# Patient Record
Sex: FEMALE | Race: WHITE | NOT HISPANIC OR LATINO | Employment: OTHER | URBAN - METROPOLITAN AREA
[De-identification: names, ages, dates, MRNs, and addresses within clinical notes are randomized per-mention and may not be internally consistent; named-entity substitution may affect disease eponyms.]

---

## 2023-03-09 ENCOUNTER — APPOINTMENT (OUTPATIENT)
Dept: GENERAL RADIOLOGY | Facility: HOSPITAL | Age: 73
End: 2023-03-09
Payer: MEDICARE

## 2023-03-09 ENCOUNTER — APPOINTMENT (OUTPATIENT)
Dept: CT IMAGING | Facility: HOSPITAL | Age: 73
End: 2023-03-09
Payer: MEDICARE

## 2023-03-09 ENCOUNTER — HOSPITAL ENCOUNTER (EMERGENCY)
Facility: HOSPITAL | Age: 73
Discharge: HOME | End: 2023-03-10
Attending: EMERGENCY MEDICINE | Admitting: EMERGENCY MEDICINE
Payer: MEDICARE

## 2023-03-09 DIAGNOSIS — F03.C0 SEVERE DEMENTIA WITHOUT BEHAVIORAL DISTURBANCE, PSYCHOTIC DISTURBANCE, MOOD DISTURBANCE, OR ANXIETY, UNSPECIFIED DEMENTIA TYPE: ICD-10-CM

## 2023-03-09 DIAGNOSIS — N39.0 ACUTE UTI: ICD-10-CM

## 2023-03-09 DIAGNOSIS — R10.30 LOWER ABDOMINAL PAIN: Primary | ICD-10-CM

## 2023-03-09 LAB
ALBUMIN SERPL-MCNC: 4.1 G/DL (ref 3.5–5.2)
ALBUMIN/GLOB SERPL: 1.5 G/DL
ALP SERPL-CCNC: 96 U/L (ref 39–117)
ALT SERPL W P-5'-P-CCNC: 10 U/L (ref 1–33)
ANION GAP SERPL CALCULATED.3IONS-SCNC: 12 MMOL/L (ref 5–15)
AST SERPL-CCNC: 28 U/L (ref 1–32)
BACTERIA UR QL AUTO: ABNORMAL /HPF
BASOPHILS # BLD AUTO: 0.1 10*3/MM3 (ref 0–0.2)
BASOPHILS NFR BLD AUTO: 0.7 % (ref 0–1.5)
BILIRUB SERPL-MCNC: 0.2 MG/DL (ref 0–1.2)
BILIRUB UR QL STRIP: NEGATIVE
BUN SERPL-MCNC: 31 MG/DL (ref 8–23)
BUN/CREAT SERPL: 26.3 (ref 7–25)
CALCIUM SPEC-SCNC: 9.2 MG/DL (ref 8.6–10.5)
CHLORIDE SERPL-SCNC: 110 MMOL/L (ref 98–107)
CLARITY UR: ABNORMAL
CO2 SERPL-SCNC: 23 MMOL/L (ref 22–29)
COLOR UR: YELLOW
CREAT SERPL-MCNC: 1.18 MG/DL (ref 0.57–1)
DEPRECATED RDW RBC AUTO: 46.3 FL (ref 37–54)
EGFRCR SERPLBLD CKD-EPI 2021: 48.6 ML/MIN/1.73
EOSINOPHIL # BLD AUTO: 0.05 10*3/MM3 (ref 0–0.4)
EOSINOPHIL NFR BLD AUTO: 0.4 % (ref 0.3–6.2)
ERYTHROCYTE [DISTWIDTH] IN BLOOD BY AUTOMATED COUNT: 12.9 % (ref 12.3–15.4)
GEN 5 2HR TROPONIN T REFLEX: 18 NG/L
GLOBULIN UR ELPH-MCNC: 2.7 GM/DL
GLUCOSE SERPL-MCNC: 152 MG/DL (ref 65–99)
GLUCOSE UR STRIP-MCNC: NEGATIVE MG/DL
HCT VFR BLD AUTO: 32.3 % (ref 34–46.6)
HGB BLD-MCNC: 10.3 G/DL (ref 12–15.9)
HGB UR QL STRIP.AUTO: ABNORMAL
HOLD SPECIMEN: NORMAL
HYALINE CASTS UR QL AUTO: ABNORMAL /LPF
IMM GRANULOCYTES # BLD AUTO: 0.04 10*3/MM3 (ref 0–0.05)
IMM GRANULOCYTES NFR BLD AUTO: 0.3 % (ref 0–0.5)
KETONES UR QL STRIP: ABNORMAL
LEUKOCYTE ESTERASE UR QL STRIP.AUTO: ABNORMAL
LIPASE SERPL-CCNC: 27 U/L (ref 13–60)
LYMPHOCYTES # BLD AUTO: 1.03 10*3/MM3 (ref 0.7–3.1)
LYMPHOCYTES NFR BLD AUTO: 7.6 % (ref 19.6–45.3)
MCH RBC QN AUTO: 31.3 PG (ref 26.6–33)
MCHC RBC AUTO-ENTMCNC: 31.9 G/DL (ref 31.5–35.7)
MCV RBC AUTO: 98.2 FL (ref 79–97)
MONOCYTES # BLD AUTO: 1.21 10*3/MM3 (ref 0.1–0.9)
MONOCYTES NFR BLD AUTO: 8.9 % (ref 5–12)
NEUTROPHILS NFR BLD AUTO: 11.1 10*3/MM3 (ref 1.7–7)
NEUTROPHILS NFR BLD AUTO: 82.1 % (ref 42.7–76)
NITRITE UR QL STRIP: POSITIVE
NRBC BLD AUTO-RTO: 0 /100 WBC (ref 0–0.2)
PH UR STRIP.AUTO: <=5 [PH] (ref 5–8)
PLATELET # BLD AUTO: 330 10*3/MM3 (ref 140–450)
PMV BLD AUTO: 10 FL (ref 6–12)
POTASSIUM SERPL-SCNC: 3.8 MMOL/L (ref 3.5–5.2)
PROT SERPL-MCNC: 6.8 G/DL (ref 6–8.5)
PROT UR QL STRIP: ABNORMAL
RBC # BLD AUTO: 3.29 10*6/MM3 (ref 3.77–5.28)
RBC # UR STRIP: ABNORMAL /HPF
REF LAB TEST METHOD: ABNORMAL
SODIUM SERPL-SCNC: 145 MMOL/L (ref 136–145)
SP GR UR STRIP: 1.02 (ref 1–1.03)
SQUAMOUS #/AREA URNS HPF: ABNORMAL /HPF
TROPONIN T DELTA: 2 NG/L
TROPONIN T SERPL HS-MCNC: 16 NG/L
UROBILINOGEN UR QL STRIP: ABNORMAL
WBC # UR STRIP: ABNORMAL /HPF
WBC NRBC COR # BLD: 13.53 10*3/MM3 (ref 3.4–10.8)
WHOLE BLOOD HOLD COAG: NORMAL
WHOLE BLOOD HOLD SPECIMEN: NORMAL

## 2023-03-09 PROCEDURE — 80053 COMPREHEN METABOLIC PANEL: CPT | Performed by: EMERGENCY MEDICINE

## 2023-03-09 PROCEDURE — 96365 THER/PROPH/DIAG IV INF INIT: CPT

## 2023-03-09 PROCEDURE — 74176 CT ABD & PELVIS W/O CONTRAST: CPT

## 2023-03-09 PROCEDURE — 25010000002 CEFTRIAXONE PER 250 MG: Performed by: EMERGENCY MEDICINE

## 2023-03-09 PROCEDURE — 73560 X-RAY EXAM OF KNEE 1 OR 2: CPT

## 2023-03-09 PROCEDURE — 85025 COMPLETE CBC W/AUTO DIFF WBC: CPT | Performed by: EMERGENCY MEDICINE

## 2023-03-09 PROCEDURE — 83690 ASSAY OF LIPASE: CPT | Performed by: EMERGENCY MEDICINE

## 2023-03-09 PROCEDURE — 87040 BLOOD CULTURE FOR BACTERIA: CPT | Performed by: EMERGENCY MEDICINE

## 2023-03-09 PROCEDURE — 81001 URINALYSIS AUTO W/SCOPE: CPT | Performed by: EMERGENCY MEDICINE

## 2023-03-09 PROCEDURE — 84484 ASSAY OF TROPONIN QUANT: CPT | Performed by: EMERGENCY MEDICINE

## 2023-03-09 PROCEDURE — 87186 SC STD MICRODIL/AGAR DIL: CPT | Performed by: EMERGENCY MEDICINE

## 2023-03-09 PROCEDURE — 99283 EMERGENCY DEPT VISIT LOW MDM: CPT

## 2023-03-09 PROCEDURE — 36415 COLL VENOUS BLD VENIPUNCTURE: CPT

## 2023-03-09 PROCEDURE — 87086 URINE CULTURE/COLONY COUNT: CPT | Performed by: EMERGENCY MEDICINE

## 2023-03-09 PROCEDURE — P9612 CATHETERIZE FOR URINE SPEC: HCPCS

## 2023-03-09 PROCEDURE — 87077 CULTURE AEROBIC IDENTIFY: CPT | Performed by: EMERGENCY MEDICINE

## 2023-03-09 PROCEDURE — 73502 X-RAY EXAM HIP UNI 2-3 VIEWS: CPT

## 2023-03-09 RX ORDER — CEFDINIR 300 MG/1
300 CAPSULE ORAL 2 TIMES DAILY
Qty: 14 CAPSULE | Refills: 0 | Status: SHIPPED | OUTPATIENT
Start: 2023-03-09 | End: 2023-03-16

## 2023-03-09 RX ORDER — SODIUM CHLORIDE 0.9 % (FLUSH) 0.9 %
10 SYRINGE (ML) INJECTION AS NEEDED
Status: DISCONTINUED | OUTPATIENT
Start: 2023-03-09 | End: 2023-03-10 | Stop reason: HOSPADM

## 2023-03-09 RX ADMIN — SODIUM CHLORIDE 1000 ML: 9 INJECTION, SOLUTION INTRAVENOUS at 23:45

## 2023-03-09 RX ADMIN — SODIUM CHLORIDE 1 G: 900 INJECTION INTRAVENOUS at 23:45

## 2023-03-09 NOTE — ED PROVIDER NOTES
Subjective   History of Present Illness  Patient is a pleasant 72-year-old female with history of severe dementia who presents with pain.   states for the past 3 days she has had difficulty walking and even slight movements in bed or transitions her from one position to another she grimaces.  He is unable to decipher where her pain is coming from.  She has not had any vomiting.  He does not know of any fevers.  She has not had any difficulty breathing.  She actually appears to be very comfortable until she is moved.  He states that even in bed the last few nights she has been bringing her knees up and getting in the fetal position which is very unusual for her.  Denies similar episodes in the past.  As noted above, due to her severe dementia she is unable to communicate.        Review of Systems   All other systems reviewed and are negative.      No past medical history on file.    No Known Allergies    No past surgical history on file.    No family history on file.    Social History     Socioeconomic History   • Marital status:            Objective   Physical Exam  Vitals and nursing note reviewed.   Constitutional:       Appearance: Normal appearance. She is normal weight.   HENT:      Head: Normocephalic.      Comments: Small healing contusion on her left  Eyes:      Extraocular Movements: Extraocular movements intact.      Conjunctiva/sclera: Conjunctivae normal.      Pupils: Pupils are equal, round, and reactive to light.   Cardiovascular:      Rate and Rhythm: Normal rate and regular rhythm.      Pulses: Normal pulses.      Heart sounds: Normal heart sounds. No murmur heard.  Pulmonary:      Effort: Pulmonary effort is normal. No respiratory distress.      Breath sounds: Normal breath sounds. No wheezing or rhonchi.   Abdominal:      General: Bowel sounds are normal. There is no distension.      Palpations: Abdomen is soft.      Tenderness: There is abdominal tenderness (Mild tenderness through  the lower abdomen.). There is no guarding or rebound.   Musculoskeletal:         General: Normal range of motion.      Cervical back: Normal range of motion. No rigidity.      Comments: Patient has discomfort with range of motion of her right lower extremity.  I am unable to localize the pain between her hip or knee.  Is a small contusion just above her right knee but this does not seem particularly painful to palpation.   Skin:     General: Skin is warm and dry.      Capillary Refill: Capillary refill takes less than 2 seconds.   Neurological:      General: No focal deficit present.      Mental Status: She is alert. Mental status is at baseline. She is disoriented.      Comments: Patient is unable to speak or communicate, consistent with her dementia   Psychiatric:         Mood and Affect: Mood normal.         Behavior: Behavior normal.         Thought Content: Thought content normal.         Procedures           ED Course      Recent Results (from the past 24 hour(s))   Comprehensive Metabolic Panel    Collection Time: 03/09/23  6:50 PM    Specimen: Blood   Result Value Ref Range    Glucose 152 (H) 65 - 99 mg/dL    BUN 31 (H) 8 - 23 mg/dL    Creatinine 1.18 (H) 0.57 - 1.00 mg/dL    Sodium 145 136 - 145 mmol/L    Potassium 3.8 3.5 - 5.2 mmol/L    Chloride 110 (H) 98 - 107 mmol/L    CO2 23.0 22.0 - 29.0 mmol/L    Calcium 9.2 8.6 - 10.5 mg/dL    Total Protein 6.8 6.0 - 8.5 g/dL    Albumin 4.1 3.5 - 5.2 g/dL    ALT (SGPT) 10 1 - 33 U/L    AST (SGOT) 28 1 - 32 U/L    Alkaline Phosphatase 96 39 - 117 U/L    Total Bilirubin 0.2 0.0 - 1.2 mg/dL    Globulin 2.7 gm/dL    A/G Ratio 1.5 g/dL    BUN/Creatinine Ratio 26.3 (H) 7.0 - 25.0    Anion Gap 12.0 5.0 - 15.0 mmol/L    eGFR 48.6 (L) >60.0 mL/min/1.73   Lipase    Collection Time: 03/09/23  6:50 PM    Specimen: Blood   Result Value Ref Range    Lipase 27 13 - 60 U/L   CBC Auto Differential    Collection Time: 03/09/23  6:50 PM    Specimen: Blood   Result Value Ref Range     WBC 13.53 (H) 3.40 - 10.80 10*3/mm3    RBC 3.29 (L) 3.77 - 5.28 10*6/mm3    Hemoglobin 10.3 (L) 12.0 - 15.9 g/dL    Hematocrit 32.3 (L) 34.0 - 46.6 %    MCV 98.2 (H) 79.0 - 97.0 fL    MCH 31.3 26.6 - 33.0 pg    MCHC 31.9 31.5 - 35.7 g/dL    RDW 12.9 12.3 - 15.4 %    RDW-SD 46.3 37.0 - 54.0 fl    MPV 10.0 6.0 - 12.0 fL    Platelets 330 140 - 450 10*3/mm3    Neutrophil % 82.1 (H) 42.7 - 76.0 %    Lymphocyte % 7.6 (L) 19.6 - 45.3 %    Monocyte % 8.9 5.0 - 12.0 %    Eosinophil % 0.4 0.3 - 6.2 %    Basophil % 0.7 0.0 - 1.5 %    Immature Grans % 0.3 0.0 - 0.5 %    Neutrophils, Absolute 11.10 (H) 1.70 - 7.00 10*3/mm3    Lymphocytes, Absolute 1.03 0.70 - 3.10 10*3/mm3    Monocytes, Absolute 1.21 (H) 0.10 - 0.90 10*3/mm3    Eosinophils, Absolute 0.05 0.00 - 0.40 10*3/mm3    Basophils, Absolute 0.10 0.00 - 0.20 10*3/mm3    Immature Grans, Absolute 0.04 0.00 - 0.05 10*3/mm3    nRBC 0.0 0.0 - 0.2 /100 WBC   Green Top (Gel)    Collection Time: 03/09/23  6:50 PM   Result Value Ref Range    Extra Tube Hold for add-ons.    Lavender Top    Collection Time: 03/09/23  6:50 PM   Result Value Ref Range    Extra Tube hold for add-on    Gold Top - SST    Collection Time: 03/09/23  6:50 PM   Result Value Ref Range    Extra Tube Hold for add-ons.    Gray Top    Collection Time: 03/09/23  6:50 PM   Result Value Ref Range    Extra Tube Hold for add-ons.    Light Blue Top    Collection Time: 03/09/23  6:50 PM   Result Value Ref Range    Extra Tube Hold for add-ons.    High Sensitivity Troponin T    Collection Time: 03/09/23  6:50 PM    Specimen: Blood   Result Value Ref Range    HS Troponin T 16 (H) <10 ng/L   Urinalysis With Microscopic If Indicated (No Culture) - Urine, Catheter    Collection Time: 03/09/23  9:22 PM    Specimen: Urine, Catheter   Result Value Ref Range    Color, UA Yellow Yellow, Straw    Appearance, UA Cloudy (A) Clear    pH, UA <=5.0 5.0 - 8.0    Specific Gravity, UA 1.025 1.001 - 1.030    Glucose, UA Negative Negative     Ketones, UA Trace (A) Negative    Bilirubin, UA Negative Negative    Blood, UA Trace (A) Negative    Protein, UA 30 mg/dL (1+) (A) Negative    Leuk Esterase, UA Small (1+) (A) Negative    Nitrite, UA Positive (A) Negative    Urobilinogen, UA 1.0 E.U./dL 0.2 - 1.0 E.U./dL   Urinalysis, Microscopic Only - Urine, Catheter    Collection Time: 03/09/23  9:22 PM    Specimen: Urine, Catheter   Result Value Ref Range    RBC, UA None Seen None Seen, 0-2 /HPF    WBC, UA 21-30 (A) None Seen, 0-2 /HPF    Bacteria, UA 4+ (A) None Seen, Trace /HPF    Squamous Epithelial Cells, UA None Seen None Seen, 0-2 /HPF    Hyaline Casts, UA 0-6 0 - 6 /LPF    Methodology Automated Microscopy    High Sensitivity Troponin T 2Hr    Collection Time: 03/09/23  9:59 PM    Specimen: Arm, Right; Blood   Result Value Ref Range    HS Troponin T 18 (H) <10 ng/L    Troponin T Delta 2 >=-4 - <+4 ng/L     Note: In addition to lab results from this visit, the labs listed above may include labs taken at another facility or during a different encounter within the last 24 hours. Please correlate lab times with ED admission and discharge times for further clarification of the services performed during this visit.    CT Abdomen Pelvis Without Contrast   Final Result   Impression:   No acute abdominal or pelvic abnormality is identified on noncontrast CT.         Electronically Signed: Mary Jane Jaramillo     3/9/2023 8:47 PM EST     Workstation ID: [id number]      XR Knee 1 or 2 View Right   Final Result   Impression:   1.No acute osseous abnormality is identified of the right knee.   2.Mild tricompartmental arthritic change.      Electronically Signed: Mary Jane Jaramillo     3/9/2023 8:04 PM EST     Workstation ID: [id number]      XR Hip With or Without Pelvis 2 - 3 View Right   Final Result   Impression:   No acute osseous abnormality is identified in the right hip.      Electronically Signed: Mary Jane Jaramillo     3/9/2023 8:05 PM EST     Workstation ID: [id number]         Vitals:    03/09/23 2200 03/09/23 2300 03/10/23 0000 03/10/23 0030   BP: 127/68 117/65 134/61 128/70   BP Location:       Patient Position:       Pulse:       Resp:       Temp:       TempSrc:       SpO2:         Medications   sodium chloride 0.9 % bolus 1,000 mL (0 mL Intravenous Stopped 3/10/23 0052)   cefTRIAXone (ROCEPHIN) 1 g/100 mL 0.9% NS (MBP) (0 g Intravenous Stopped 3/10/23 0052)     ECG/EMG Results (last 24 hours)     ** No results found for the last 24 hours. **        No orders to display           Medical Decision Making  Pt presents with pain at unknown location.  Broad differential including intraabdominal, back, and lower extremity joint etiologies. Severe dementia limits diagnostic ability.  UTI noted.   states that he is able to care for her at home.  He understands to have a low threshold to return to the ED if symptoms persist, worsen, or other concerns arise.    Acute UTI: complicated acute illness or injury  Lower abdominal pain: complicated acute illness or injury  Amount and/or Complexity of Data Reviewed  Independent Historian: spouse  Labs: ordered. Decision-making details documented in ED Course.  Radiology: ordered and independent interpretation performed. Decision-making details documented in ED Course.      Risk  Prescription drug management.          Final diagnoses:   Lower abdominal pain   Acute UTI   Severe dementia without behavioral disturbance, psychotic disturbance, mood disturbance, or anxiety, unspecified dementia type       ED Disposition  ED Disposition     ED Disposition   Discharge    Condition   Stable    Comment   --           DISCHARGE    Patient discharged in stable condition.    Reviewed implications of results, diagnosis, meds, responsibility to follow up, warning signs and symptoms of possible worsening, potential complications and reasons to return to ER.    Patient/Family voiced understanding of above instructions.    Discussed plan for discharge, as  there is no emergent indication for admission.  Pt/family is agreeable and understands need for follow up and possible repeat testing.  Pt/family is aware that discharge does not mean that nothing is wrong but that it indicates no emergency is currently present that requires admission and they must continue care with follow-up as given below or with a physician of their choice.     FOLLOW-UP  Mukul Fontaine MD  [address]  [facility] [id number] [phone]    Schedule an appointment as soon as possible for a visit       [facility] Emergency [facility]  [address] [place] [id number] [phone]    If symptoms worsen         Medication List      New Prescriptions    cefdinir 300 MG capsule  Commonly known as: OMNICEF  Take 1 capsule by mouth 2 (Two) Times a Day for 7 days.           Where to Get Your Medications      These medications were sent to [id number] - [place], [place] - [address] [facility] [address] - [phone]  - [phone] FX  [address] [address], [facility] [address]    Phone: [phone]   · cefdinir 300 MG capsule            Bryn Castanon DO  03/10/23 0936

## 2023-03-10 VITALS
OXYGEN SATURATION: 97 % | HEART RATE: 87 BPM | DIASTOLIC BLOOD PRESSURE: 70 MMHG | SYSTOLIC BLOOD PRESSURE: 128 MMHG | TEMPERATURE: 97.3 F | RESPIRATION RATE: 18 BRPM

## 2023-03-12 LAB
BACTERIA SPEC AEROBE CULT: ABNORMAL
BACTERIA SPEC AEROBE CULT: ABNORMAL

## 2023-03-15 LAB
BACTERIA SPEC AEROBE CULT: NORMAL
BACTERIA SPEC AEROBE CULT: NORMAL

## 2023-04-16 ENCOUNTER — APPOINTMENT (OUTPATIENT)
Dept: CT IMAGING | Facility: HOSPITAL | Age: 73
End: 2023-04-16
Payer: MEDICARE

## 2023-04-16 ENCOUNTER — HOSPITAL ENCOUNTER (OUTPATIENT)
Facility: HOSPITAL | Age: 73
Setting detail: OBSERVATION
Discharge: HOME | End: 2023-04-22
Attending: EMERGENCY MEDICINE | Admitting: INTERNAL MEDICINE
Payer: MEDICARE

## 2023-04-16 ENCOUNTER — APPOINTMENT (OUTPATIENT)
Dept: GENERAL RADIOLOGY | Facility: HOSPITAL | Age: 73
End: 2023-04-16
Payer: MEDICARE

## 2023-04-16 DIAGNOSIS — R82.71 BACTERIURIA WITH PYURIA: ICD-10-CM

## 2023-04-16 DIAGNOSIS — F02.C0 SEVERE ALZHEIMER'S DEMENTIA, UNSPECIFIED TIMING OF DEMENTIA ONSET, UNSPECIFIED WHETHER BEHAVIORAL, PSYCHOTIC, OR MOOD DISTURBANCE OR ANXIETY: ICD-10-CM
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
## 2023-04-16 DIAGNOSIS — G30.9 SEVERE ALZHEIMER'S DEMENTIA, UNSPECIFIED TIMING OF DEMENTIA ONSET, UNSPECIFIED WHETHER BEHAVIORAL, PSYCHOTIC, OR MOOD DISTURBANCE OR ANXIETY: ICD-10-CM

## 2023-04-16 DIAGNOSIS — R82.81 BACTERIURIA WITH PYURIA: ICD-10-CM

## 2023-04-16 DIAGNOSIS — S32.82XA MULTIPLE CLOSED FRACTURES OF PELVIS WITHOUT DISRUPTION OF PELVIC RING, INITIAL ENCOUNTER: ICD-10-CM

## 2023-04-16 PROBLEM — M19.90 OA (OSTEOARTHRITIS): Status: ACTIVE | Noted: 2023-04-16

## 2023-04-16 PROBLEM — F03.90 DEMENTIA: Status: ACTIVE | Noted: 2023-04-16

## 2023-04-16 PROBLEM — S32.599A PUBIC RAMUS FRACTURE: Status: ACTIVE | Noted: 2023-04-16

## 2023-04-16 PROBLEM — E87.6 HYPOKALEMIA: Status: ACTIVE | Noted: 2023-04-16

## 2023-04-16 PROBLEM — N18.30 CKD (CHRONIC KIDNEY DISEASE) STAGE 3, GFR 30-59 ML/MIN: Status: ACTIVE | Noted: 2023-04-16

## 2023-04-16 PROBLEM — S32.10XA SACRAL FRACTURE: Status: ACTIVE | Noted: 2023-04-16

## 2023-04-16 PROBLEM — N39.0 UTI (URINARY TRACT INFECTION): Status: ACTIVE | Noted: 2023-04-16

## 2023-04-16 PROBLEM — E78.5 HLD (HYPERLIPIDEMIA): Status: ACTIVE | Noted: 2023-04-16

## 2023-04-16 LAB
ABO GROUP BLD: NORMAL
ABO GROUP BLD: NORMAL
ALBUMIN SERPL-MCNC: 3.4 G/DL (ref 3.5–5.2)
ALBUMIN/GLOB SERPL: 1.2 G/DL
ALP SERPL-CCNC: 114 U/L (ref 39–117)
ALT SERPL W P-5'-P-CCNC: 15 U/L (ref 1–33)
ANION GAP SERPL CALCULATED.3IONS-SCNC: 12 MMOL/L (ref 5–15)
APTT PPP: 28.1 SECONDS (ref 60–90)
AST SERPL-CCNC: 26 U/L (ref 1–32)
BACTERIA UR QL AUTO: ABNORMAL /HPF
BASOPHILS # BLD AUTO: 0.09 10*3/MM3 (ref 0–0.2)
BASOPHILS NFR BLD AUTO: 0.6 % (ref 0–1.5)
BILIRUB SERPL-MCNC: 0.6 MG/DL (ref 0–1.2)
BILIRUB UR QL STRIP: NEGATIVE
BLD GP AB SCN SERPL QL: NEGATIVE
BUN SERPL-MCNC: 18 MG/DL (ref 8–23)
BUN/CREAT SERPL: 16.8 (ref 7–25)
CALCIUM SPEC-SCNC: 8.6 MG/DL (ref 8.6–10.5)
CHLORIDE SERPL-SCNC: 103 MMOL/L (ref 98–107)
CLARITY UR: ABNORMAL
CO2 SERPL-SCNC: 26 MMOL/L (ref 22–29)
COLOR UR: YELLOW
CREAT SERPL-MCNC: 1.07 MG/DL (ref 0.57–1)
DEPRECATED RDW RBC AUTO: 45.7 FL (ref 37–54)
EGFRCR SERPLBLD CKD-EPI 2021: 54.3 ML/MIN/1.73
EOSINOPHIL # BLD AUTO: 0.09 10*3/MM3 (ref 0–0.4)
EOSINOPHIL NFR BLD AUTO: 0.6 % (ref 0.3–6.2)
ERYTHROCYTE [DISTWIDTH] IN BLOOD BY AUTOMATED COUNT: 13 % (ref 12.3–15.4)
GLOBULIN UR ELPH-MCNC: 2.9 GM/DL
GLUCOSE SERPL-MCNC: 140 MG/DL (ref 65–99)
GLUCOSE UR STRIP-MCNC: NEGATIVE MG/DL
HCT VFR BLD AUTO: 31.6 % (ref 34–46.6)
HGB BLD-MCNC: 10.3 G/DL (ref 12–15.9)
HGB UR QL STRIP.AUTO: ABNORMAL
HYALINE CASTS UR QL AUTO: ABNORMAL /LPF
IMM GRANULOCYTES # BLD AUTO: 0.08 10*3/MM3 (ref 0–0.05)
IMM GRANULOCYTES NFR BLD AUTO: 0.5 % (ref 0–0.5)
INR PPP: 1.1 (ref 0.84–1.13)
KETONES UR QL STRIP: ABNORMAL
LEUKOCYTE ESTERASE UR QL STRIP.AUTO: ABNORMAL
LYMPHOCYTES # BLD AUTO: 0.92 10*3/MM3 (ref 0.7–3.1)
LYMPHOCYTES NFR BLD AUTO: 6.2 % (ref 19.6–45.3)
MCH RBC QN AUTO: 31.3 PG (ref 26.6–33)
MCHC RBC AUTO-ENTMCNC: 32.6 G/DL (ref 31.5–35.7)
MCV RBC AUTO: 96 FL (ref 79–97)
MONOCYTES # BLD AUTO: 1.17 10*3/MM3 (ref 0.1–0.9)
MONOCYTES NFR BLD AUTO: 7.9 % (ref 5–12)
NEUTROPHILS NFR BLD AUTO: 12.48 10*3/MM3 (ref 1.7–7)
NEUTROPHILS NFR BLD AUTO: 84.2 % (ref 42.7–76)
NITRITE UR QL STRIP: POSITIVE
NRBC BLD AUTO-RTO: 0 /100 WBC (ref 0–0.2)
PH UR STRIP.AUTO: 7 [PH] (ref 5–8)
PLATELET # BLD AUTO: 330 10*3/MM3 (ref 140–450)
PMV BLD AUTO: 10 FL (ref 6–12)
POTASSIUM SERPL-SCNC: 3.4 MMOL/L (ref 3.5–5.2)
PROT SERPL-MCNC: 6.3 G/DL (ref 6–8.5)
PROT UR QL STRIP: ABNORMAL
PROTHROMBIN TIME: 14.2 SECONDS (ref 11.4–14.4)
RBC # BLD AUTO: 3.29 10*6/MM3 (ref 3.77–5.28)
RBC # UR STRIP: ABNORMAL /HPF
REF LAB TEST METHOD: ABNORMAL
RH BLD: POSITIVE
RH BLD: POSITIVE
SODIUM SERPL-SCNC: 141 MMOL/L (ref 136–145)
SP GR UR STRIP: 1.03 (ref 1–1.03)
SQUAMOUS #/AREA URNS HPF: ABNORMAL /HPF
T&S EXPIRATION DATE: NORMAL
UROBILINOGEN UR QL STRIP: ABNORMAL
WBC # UR STRIP: ABNORMAL /HPF
WBC NRBC COR # BLD: 14.83 10*3/MM3 (ref 3.4–10.8)

## 2023-04-16 PROCEDURE — 36415 COLL VENOUS BLD VENIPUNCTURE: CPT

## 2023-04-16 PROCEDURE — 93005 ELECTROCARDIOGRAM TRACING: CPT | Performed by: EMERGENCY MEDICINE

## 2023-04-16 PROCEDURE — 86900 BLOOD TYPING SEROLOGIC ABO: CPT | Performed by: EMERGENCY MEDICINE

## 2023-04-16 PROCEDURE — 99285 EMERGENCY DEPT VISIT HI MDM: CPT

## 2023-04-16 PROCEDURE — 25010000002 CEFTRIAXONE PER 250 MG

## 2023-04-16 PROCEDURE — 96375 TX/PRO/DX INJ NEW DRUG ADDON: CPT

## 2023-04-16 PROCEDURE — 80053 COMPREHEN METABOLIC PANEL: CPT | Performed by: EMERGENCY MEDICINE

## 2023-04-16 PROCEDURE — 81001 URINALYSIS AUTO W/SCOPE: CPT | Performed by: EMERGENCY MEDICINE

## 2023-04-16 PROCEDURE — 85025 COMPLETE CBC W/AUTO DIFF WBC: CPT | Performed by: EMERGENCY MEDICINE

## 2023-04-16 PROCEDURE — 86900 BLOOD TYPING SEROLOGIC ABO: CPT

## 2023-04-16 PROCEDURE — 25010000002 HYDROMORPHONE PER 4 MG: Performed by: INTERNAL MEDICINE

## 2023-04-16 PROCEDURE — G0378 HOSPITAL OBSERVATION PER HR: HCPCS

## 2023-04-16 PROCEDURE — 85610 PROTHROMBIN TIME: CPT | Performed by: EMERGENCY MEDICINE

## 2023-04-16 PROCEDURE — 99222 1ST HOSP IP/OBS MODERATE 55: CPT

## 2023-04-16 PROCEDURE — 85730 THROMBOPLASTIN TIME PARTIAL: CPT | Performed by: EMERGENCY MEDICINE

## 2023-04-16 PROCEDURE — 87077 CULTURE AEROBIC IDENTIFY: CPT | Performed by: EMERGENCY MEDICINE

## 2023-04-16 PROCEDURE — P9612 CATHETERIZE FOR URINE SPEC: HCPCS

## 2023-04-16 PROCEDURE — 25010000002 ONDANSETRON PER 1 MG: Performed by: EMERGENCY MEDICINE

## 2023-04-16 PROCEDURE — 87086 URINE CULTURE/COLONY COUNT: CPT | Performed by: EMERGENCY MEDICINE

## 2023-04-16 PROCEDURE — 86901 BLOOD TYPING SEROLOGIC RH(D): CPT | Performed by: EMERGENCY MEDICINE

## 2023-04-16 PROCEDURE — 25010000002 MORPHINE PER 10 MG: Performed by: EMERGENCY MEDICINE

## 2023-04-16 PROCEDURE — 73502 X-RAY EXAM HIP UNI 2-3 VIEWS: CPT

## 2023-04-16 PROCEDURE — 72192 CT PELVIS W/O DYE: CPT

## 2023-04-16 PROCEDURE — 86901 BLOOD TYPING SEROLOGIC RH(D): CPT

## 2023-04-16 PROCEDURE — 86850 RBC ANTIBODY SCREEN: CPT | Performed by: EMERGENCY MEDICINE

## 2023-04-16 PROCEDURE — 87186 SC STD MICRODIL/AGAR DIL: CPT | Performed by: EMERGENCY MEDICINE

## 2023-04-16 RX ORDER — SODIUM CHLORIDE 9 MG/ML
40 INJECTION, SOLUTION INTRAVENOUS AS NEEDED
Status: DISCONTINUED | OUTPATIENT
Start: 2023-04-16 | End: 2023-04-22 | Stop reason: HOSPADM

## 2023-04-16 RX ORDER — ONDANSETRON 2 MG/ML
4 INJECTION INTRAMUSCULAR; INTRAVENOUS ONCE
Status: COMPLETED | OUTPATIENT
Start: 2023-04-16 | End: 2023-04-16

## 2023-04-16 RX ORDER — SODIUM CHLORIDE 0.9 % (FLUSH) 0.9 %
10 SYRINGE (ML) INJECTION AS NEEDED
Status: DISCONTINUED | OUTPATIENT
Start: 2023-04-16 | End: 2023-04-22 | Stop reason: HOSPADM

## 2023-04-16 RX ORDER — ACETAMINOPHEN 650 MG/1
650 SUPPOSITORY RECTAL EVERY 4 HOURS PRN
Status: DISCONTINUED | OUTPATIENT
Start: 2023-04-16 | End: 2023-04-22 | Stop reason: HOSPADM

## 2023-04-16 RX ORDER — ACETAMINOPHEN 325 MG/1
650 TABLET ORAL EVERY 4 HOURS PRN
Status: DISCONTINUED | OUTPATIENT
Start: 2023-04-16 | End: 2023-04-22 | Stop reason: HOSPADM

## 2023-04-16 RX ORDER — HYDROMORPHONE HYDROCHLORIDE 1 MG/ML
0.5 INJECTION, SOLUTION INTRAMUSCULAR; INTRAVENOUS; SUBCUTANEOUS EVERY 4 HOURS PRN
Status: DISCONTINUED | OUTPATIENT
Start: 2023-04-16 | End: 2023-04-22 | Stop reason: HOSPADM

## 2023-04-16 RX ORDER — SODIUM CHLORIDE 0.9 % (FLUSH) 0.9 %
10 SYRINGE (ML) INJECTION EVERY 12 HOURS SCHEDULED
Status: DISCONTINUED | OUTPATIENT
Start: 2023-04-16 | End: 2023-04-22 | Stop reason: HOSPADM

## 2023-04-16 RX ORDER — ONDANSETRON 4 MG/1
4 TABLET, FILM COATED ORAL EVERY 6 HOURS PRN
Status: DISCONTINUED | OUTPATIENT
Start: 2023-04-16 | End: 2023-04-22 | Stop reason: HOSPADM

## 2023-04-16 RX ORDER — ONDANSETRON 2 MG/ML
4 INJECTION INTRAMUSCULAR; INTRAVENOUS EVERY 6 HOURS PRN
Status: DISCONTINUED | OUTPATIENT
Start: 2023-04-16 | End: 2023-04-22 | Stop reason: HOSPADM

## 2023-04-16 RX ORDER — MORPHINE SULFATE 4 MG/ML
4 INJECTION, SOLUTION INTRAMUSCULAR; INTRAVENOUS ONCE
Status: COMPLETED | OUTPATIENT
Start: 2023-04-16 | End: 2023-04-16

## 2023-04-16 RX ORDER — ACETAMINOPHEN 160 MG/5ML
650 SOLUTION ORAL EVERY 4 HOURS PRN
Status: DISCONTINUED | OUTPATIENT
Start: 2023-04-16 | End: 2023-04-22 | Stop reason: HOSPADM

## 2023-04-16 RX ADMIN — ONDANSETRON 4 MG: 2 INJECTION INTRAMUSCULAR; INTRAVENOUS at 16:12

## 2023-04-16 RX ADMIN — HYDROMORPHONE HYDROCHLORIDE 0.5 MG: 1 INJECTION, SOLUTION INTRAMUSCULAR; INTRAVENOUS; SUBCUTANEOUS at 21:16

## 2023-04-16 RX ADMIN — MORPHINE SULFATE 4 MG: 4 INJECTION, SOLUTION INTRAMUSCULAR; INTRAVENOUS at 16:12

## 2023-04-16 RX ADMIN — Medication 10 ML: at 21:16

## 2023-04-16 RX ADMIN — CEFTRIAXONE 1 G: 1 INJECTION, POWDER, FOR SOLUTION INTRAMUSCULAR; INTRAVENOUS at 20:40

## 2023-04-16 NOTE — LETTER
EMS Transport Request  [facility] [place], [place], and [place] only   Patient Name: Theresa Barrow : 1950   Weight:60.3 kg (133 lb) Pick-up Location: [facility] BLS/ALS: BLS/ALS: BLS   Insurance: [employer] MEDICARE REPLACEMENT Auth End Date:    Pre-Cert #: D/C Summary complete: no   Destination: Home FOR [place]: 24-hour notice needed and given, How many stairs no steps, go through the garage, Will the patient be on the main level ye, Is there a ramp available no, Can the patient stand and pivot yes with assist, Address [address] [place] [address] Dr. Doyle, and Name/contact number for who will be present Spouse Chris [phone]   Contact Precautions: None   Equipment (O2, Fluids, etc.): None   Arrive By Date/Time:  Stretcher/WC: Stretcher   CM Requesting: Lindsay Hurtado RN Ext: [phone]   Notes/Medical Necessity: Pt with alzheimer's dementia, unable to comprehend or follow commands, is bed/chair bound, able to stand and pivot with assist, unable to sit independently in a w/c due to cognitive and functional decline.      ______________________________________________________________________    *Only 2 patient bags OR 1 carry-on size bag are permitted.  Wheelchairs and walkers CANNOT transported with the patient. Acknowledge: Yes

## 2023-04-16 NOTE — H&P
[facility]  HISTORY AND PHYSICAL    Patient Name: Theresa Barrow  : 1950  MRN: [id number]  Primary Care Physician: Mukul Fontaine MD  Date of admission: 2023    Subjective   Subjective     Chief Complaint:  Fall    HPI:  Theresa Barrow is a 73 y.o. female with PMH of CKD3, HLD, OA and dementia presents to the ED after a fall. Patient has severe dementia and is total care,  is caregiver. Yesterday, patient fell out of bed. She fell onto her left side and hit her chin on the nightstand. Neighbor was able to help get her off the floor. Today, patient was able to shuffle to the bathroom with assistance. Patient's  was unable to get her off the toilet so he called EMS. Of note, patient was treated for klebsiella UTI last month. Patient is incontinent and wears depends. Due to this, she has recurrent UTIs.         Review of Systems   Unable to perform ROS: Dementia          Personal History     Past Medical History:   Diagnosis Date   • Arthritis    • Chronic kidney disease    • Depression    • Urinary tract infection              No past surgical history on file.    Family History:  family history includes Cancer in her father; Heart disease in her mother.     Social History:    Social History     Social History Narrative   • Not on file       Medications:       No Known Allergies    Objective   Objective     Vital Signs:   Heart Rate:  [65-82] 76  Resp:  [18] 18  BP: (141-149)/(65-77) 141/77  Flow (L/min):  [3] 3    Physical Exam  Vitals reviewed.   Constitutional:       General: She is not in acute distress.     Appearance: She is normal weight.   HENT:      Head: Normocephalic.      Nose: Nose normal. No congestion.      Mouth/Throat:      Mouth: Mucous membranes are dry.   Eyes:      Extraocular Movements: Extraocular movements intact.      Pupils: Pupils are equal, round, and reactive to light.   Cardiovascular:      Rate and Rhythm:  Normal rate and regular rhythm.      Pulses: Normal pulses.      Heart sounds: Normal heart sounds. No murmur heard.  Pulmonary:      Effort: Pulmonary effort is normal. No respiratory distress.      Breath sounds: Normal breath sounds. No wheezing or rhonchi.   Abdominal:      General: Bowel sounds are normal. There is no distension.      Palpations: Abdomen is soft.      Tenderness: There is no abdominal tenderness.   Musculoskeletal:         General: Tenderness (left hip) present.      Cervical back: Normal range of motion. No rigidity.      Right hip: Tenderness present. Decreased range of motion.   Skin:     General: Skin is warm.      Capillary Refill: Capillary refill takes less than 2 seconds.      Findings: Bruising (chin) present.   Neurological:      General: No focal deficit present.      Mental Status: Mental status is at baseline. She is disoriented.      Motor: Weakness present.   Psychiatric:         Speech: She is noncommunicative.         Behavior: Behavior is uncooperative and withdrawn.         Cognition and Memory: Memory is impaired.            Result Review:  I have personally reviewed the results from the time of this admission to 4/16/2023 19:01 EDT and agree with these findings:  [x]  Laboratory list / accordion  [x]  Microbiology  [x]  Radiology  []  EKG/Telemetry   []  Cardiology/Vascular   []  Pathology  [x]  Old records  []  Other:  Most notable findings include:     LAB RESULTS:      Lab 04/16/23  1639   WBC 14.83*   HEMOGLOBIN 10.3*   HEMATOCRIT 31.6*   PLATELETS 330   NEUTROS ABS 12.48*   IMMATURE GRANS (ABS) 0.08*   LYMPHS ABS 0.92   MONOS ABS 1.17*   EOS ABS 0.09   MCV 96.0   PROTIME 14.2   APTT 28.1*         Lab 04/16/23  1639   SODIUM 141   POTASSIUM 3.4*   CHLORIDE 103   CO2 26.0   ANION GAP 12.0   BUN 18   CREATININE 1.07*   EGFR 54.3*   GLUCOSE 140*   CALCIUM 8.6         Lab 04/16/23  1639   TOTAL PROTEIN 6.3   ALBUMIN 3.4*   GLOBULIN 2.9   ALT (SGPT) 15   AST (SGOT) 26    BILIRUBIN 0.6   ALK PHOS 114         Lab 04/16/23  1639   PROTIME 14.2   INR 1.10             Lab 04/16/23  1711   ABO TYPING A   RH TYPING Positive   ANTIBODY SCREEN Negative         Brief Urine Lab Results  (Last result in the past 365 days)      Color   Clarity   Blood   Leuk Est   Nitrite   Protein   CREAT   Urine HCG        04/16/23 1709 Yellow   Turbid   Moderate (2+)   Large (3+)   Positive   100 mg/dL (2+)               Microbiology Results (last 10 days)     ** No results found for the last 240 hours. **          CT Pelvis Without Contrast    Result Date: 4/16/2023  CT PELVIS WO CONTRAST Date of Exam: 4/16/2023 5:36 PM EDT Indication: fall from bed with significant left hip/pelvis pain. Comparison: Radiograph 4/16/2023 Technique: Axial CT images were obtained of the pelvis without contrast administration.  Reconstructed coronal and sagittal images were also obtained. Automated exposure control and iterative construction methods were used. Findings: There is some minimal buckling of the superior pubic ramus on the left consistent with a nondisplaced fracture. This is not seen even in retrospect on prior radiograph. There is also buckling of the left sacral ala consistent with a nondisplaced fracture. The sacroiliac joints appear intact. There is mild narrowing of the hip joint spaces bilaterally. Left proximal femur appears intact. Visualized portions the right femur also appear intact. Soft tissues of the pelvis appear within normal limits. No free intraperitoneal fluid. No pelvic adenopathy. There are multiple colonic diverticula.     Impression: Impression: 1. Acute nondisplaced fractures involving the left superior pubic ramus near the midline. There is also an acute nondisplaced fracture of the left sacral ala. Sacral iliac joint is intact. Electronically Signed: Vick Enamorado  4/16/2023 6:09 PM EDT  Workstation ID: [id number]    XR Hip With or Without Pelvis 2 - 3 View Left    Result Date:  4/16/2023  XR HIP W OR WO PELVIS 2-3 VIEW LEFT Date of Exam: 4/16/2023 4:07 PM EDT Indication: fall from bed. Comparison: 3/9/2023 Findings: There is diffuse osteopenia. There is no acute fracture or dislocation. Hip joint spaces appear within normal limits and symmetrical bilaterally. Soft tissues are unremarkable.     Impression: Impression: 1. Osteopenia. No acute bony abnormality of the pelvis or left hip. Electronically Signed: Vick Enaomrado  4/16/2023 4:30 PM EDT  Workstation ID: [id number]          Assessment & Plan   Assessment & Plan       Accidental fall from bed, initial encounter    Dementia    CKD (chronic kidney disease) stage 3, GFR 30-59 ml/min    HLD (hyperlipidemia)    OA (osteoarthritis)    UTI (urinary tract infection)    Pubic ramus fracture    Sacral fracture    Theresa Barrow is a 73 y.o. female with PMH of CKD3, HLD, OA and dementia presents to the ED after a fall.     Fall  Pubic Ramus Fracture  Sacral Fracture  -CT pelvis shows acute nondisplaced fractures involving the left superior pubic ramus and acute nondisplaced fracture of the left sacral ala  -Pain control  -Consult PT/OT, case management   -Consult ortho    UTI  -Hx of klebsiella culture last month  -Rocephin  -Pending urine culture    Anemia  -Hgb 10.3, appears baseline   -Type and screen  -Trend    Hypokalemia  -K 3.4  -Replace per protocol  -Recheck in AM    CKD3  -Cr 1.07, improved from baseline  -Avoid nephrotoxic agents  -Strict I/Os    Dementia  -Fall precautions  -Pending med rec      DVT prophylaxis:  SCDs    CODE STATUS:  DNR/DNI       Expected Discharge          Signature: Electronically signed by KATIUSKA Gibson, 04/16/23, 7:58 PM EDT.    Total APC Time: 60 minutes

## 2023-04-16 NOTE — ED PROVIDER NOTES
Subjective   History of Present Illness  Patient is 73-year-old female presenting to the emergency department with EMS secondary to left hip pain following a fall from bed.  Patient does have advanced dementia and is unable to contribute any to the history.  She demonstrates pain with movement and manipulation of the left hip.  She is not on any blood thinners.  Patient does live at home and is under the care of her .    History provided by:  EMS personnel  History limited by:  Dementia      Review of Systems    No past medical history on file.    No Known Allergies    No past surgical history on file.    No family history on file.    Social History     Socioeconomic History   • Marital status:            Objective   Physical Exam  Vitals and nursing note reviewed.   Constitutional:       General: She is not in acute distress.     Appearance: She is not toxic-appearing.   Cardiovascular:      Rate and Rhythm: Normal rate and regular rhythm.      Pulses: Normal pulses.   Pulmonary:      Effort: Pulmonary effort is normal. No respiratory distress.      Breath sounds: Normal breath sounds.   Abdominal:      Palpations: Abdomen is soft.   Musculoskeletal:      Comments: Left hip tenderness.  Mild shortening.   Neurological:      Mental Status: She is alert. Mental status is at baseline.         Procedures           ED Course  ED Course as of 04/16/23 1842   Sun Apr 16, 2023   1744 Urinalysis With Microscopic If Indicated (No Culture) - Urine, Catheter(!)  Urine does show inflammation that is consistent with prior comparisons.  I did note a culture which demonstrated likely colonization with Klebsiella.  Patient has no reported symptoms or findings to suggest need for acute treatment. [RS]   1827 Patient with evidence of nondisplaced superior pubic ramus and left sacral gato fracture.  Patient did have the recent admission and was diagnosed with urinary tract infection.  It is unclear at this point whether  the current manifestation is acute infection or whether it is more a colonization.  I do not believe the  is going to be able to take care of the patient at home with the current discomfort and challenges.  The fall apparently happened yesterday afternoon and he has been struggling to get her to the restroom and take care of her.  Thus, feel that she would benefit from admission to the hospital for consideration of placement for further care as indicated.  The  voices understanding and is agreeable. [RS]      ED Course User Index  [RS] Hector Ordoñez MD                                           Medical Decision Making  Accidental fall from bed, initial encounter: acute illness or injury  Bacteriuria with pyuria: chronic illness or injury  Multiple closed fractures of pelvis without disruption of pelvic ring, initial encounter: acute illness or injury  Severe Alzheimer's dementia, unspecified timing of dementia onset, unspecified whether behavioral, psychotic, or mood disturbance or anxiety: chronic illness or injury  Amount and/or Complexity of Data Reviewed  Independent Historian: spouse and EMS  External Data Reviewed: labs and notes.  Labs: ordered. Decision-making details documented in ED Course.  Radiology: ordered.  ECG/medicine tests: ordered.      Risk  Prescription drug management.  Decision regarding hospitalization.          Final diagnoses:   Accidental fall from bed, initial encounter   Multiple closed fractures of pelvis without disruption of pelvic ring, initial encounter   Severe Alzheimer's dementia, unspecified timing of dementia onset, unspecified whether behavioral, psychotic, or mood disturbance or anxiety   Bacteriuria with pyuria       ED Disposition  ED Disposition     ED Disposition   Decision to Admit    Condition   --    Comment   --             No follow-up provider specified.       Medication List      No changes were made to your prescriptions during this visit.           Hector Ordoñez MD  04/16/23 2956

## 2023-04-17 LAB
ANION GAP SERPL CALCULATED.3IONS-SCNC: 13 MMOL/L (ref 5–15)
BASOPHILS # BLD AUTO: 0.08 10*3/MM3 (ref 0–0.2)
BASOPHILS NFR BLD AUTO: 0.7 % (ref 0–1.5)
BUN SERPL-MCNC: 23 MG/DL (ref 8–23)
BUN/CREAT SERPL: 17.2 (ref 7–25)
CALCIUM SPEC-SCNC: 8.5 MG/DL (ref 8.6–10.5)
CHLORIDE SERPL-SCNC: 104 MMOL/L (ref 98–107)
CO2 SERPL-SCNC: 25 MMOL/L (ref 22–29)
CREAT SERPL-MCNC: 1.34 MG/DL (ref 0.57–1)
DEPRECATED RDW RBC AUTO: 46.5 FL (ref 37–54)
EGFRCR SERPLBLD CKD-EPI 2021: 41.4 ML/MIN/1.73
EOSINOPHIL # BLD AUTO: 0.32 10*3/MM3 (ref 0–0.4)
EOSINOPHIL NFR BLD AUTO: 2.8 % (ref 0.3–6.2)
ERYTHROCYTE [DISTWIDTH] IN BLOOD BY AUTOMATED COUNT: 13.1 % (ref 12.3–15.4)
GLUCOSE SERPL-MCNC: 112 MG/DL (ref 65–99)
HCT VFR BLD AUTO: 30.6 % (ref 34–46.6)
HGB BLD-MCNC: 9.7 G/DL (ref 12–15.9)
IMM GRANULOCYTES # BLD AUTO: 0.09 10*3/MM3 (ref 0–0.05)
IMM GRANULOCYTES NFR BLD AUTO: 0.8 % (ref 0–0.5)
LYMPHOCYTES # BLD AUTO: 1.62 10*3/MM3 (ref 0.7–3.1)
LYMPHOCYTES NFR BLD AUTO: 14 % (ref 19.6–45.3)
MAGNESIUM SERPL-MCNC: 2 MG/DL (ref 1.6–2.4)
MCH RBC QN AUTO: 31.3 PG (ref 26.6–33)
MCHC RBC AUTO-ENTMCNC: 31.7 G/DL (ref 31.5–35.7)
MCV RBC AUTO: 98.7 FL (ref 79–97)
MONOCYTES # BLD AUTO: 1.23 10*3/MM3 (ref 0.1–0.9)
MONOCYTES NFR BLD AUTO: 10.6 % (ref 5–12)
NEUTROPHILS NFR BLD AUTO: 71.1 % (ref 42.7–76)
NEUTROPHILS NFR BLD AUTO: 8.25 10*3/MM3 (ref 1.7–7)
NRBC BLD AUTO-RTO: 0 /100 WBC (ref 0–0.2)
PLATELET # BLD AUTO: 299 10*3/MM3 (ref 140–450)
PMV BLD AUTO: 10.3 FL (ref 6–12)
POTASSIUM SERPL-SCNC: 3.4 MMOL/L (ref 3.5–5.2)
POTASSIUM SERPL-SCNC: 4.2 MMOL/L (ref 3.5–5.2)
QT INTERVAL: 428 MS
QTC INTERVAL: 455 MS
RBC # BLD AUTO: 3.1 10*6/MM3 (ref 3.77–5.28)
SODIUM SERPL-SCNC: 142 MMOL/L (ref 136–145)
WBC NRBC COR # BLD: 11.59 10*3/MM3 (ref 3.4–10.8)

## 2023-04-17 PROCEDURE — 97161 PT EVAL LOW COMPLEX 20 MIN: CPT

## 2023-04-17 PROCEDURE — 84132 ASSAY OF SERUM POTASSIUM: CPT | Performed by: FAMILY MEDICINE

## 2023-04-17 PROCEDURE — 0 POTASSIUM CHLORIDE 10 MEQ/100ML SOLUTION: Performed by: FAMILY MEDICINE

## 2023-04-17 PROCEDURE — 97530 THERAPEUTIC ACTIVITIES: CPT

## 2023-04-17 PROCEDURE — 96376 TX/PRO/DX INJ SAME DRUG ADON: CPT

## 2023-04-17 PROCEDURE — 25010000002 HYDROMORPHONE PER 4 MG: Performed by: INTERNAL MEDICINE

## 2023-04-17 PROCEDURE — G0378 HOSPITAL OBSERVATION PER HR: HCPCS

## 2023-04-17 PROCEDURE — 96368 THER/DIAG CONCURRENT INF: CPT

## 2023-04-17 PROCEDURE — 83735 ASSAY OF MAGNESIUM: CPT

## 2023-04-17 PROCEDURE — 85025 COMPLETE CBC W/AUTO DIFF WBC: CPT

## 2023-04-17 PROCEDURE — 99231 SBSQ HOSP IP/OBS SF/LOW 25: CPT | Performed by: FAMILY MEDICINE

## 2023-04-17 PROCEDURE — 80048 BASIC METABOLIC PNL TOTAL CA: CPT

## 2023-04-17 PROCEDURE — 96365 THER/PROPH/DIAG IV INF INIT: CPT

## 2023-04-17 PROCEDURE — 25010000002 CEFTRIAXONE PER 250 MG

## 2023-04-17 PROCEDURE — 96366 THER/PROPH/DIAG IV INF ADDON: CPT

## 2023-04-17 RX ORDER — SACCHAROMYCES BOULARDII 250 MG
250 CAPSULE ORAL 2 TIMES DAILY
COMMUNITY

## 2023-04-17 RX ORDER — DONEPEZIL HYDROCHLORIDE 10 MG/1
10 TABLET, FILM COATED ORAL 2 TIMES DAILY
Status: DISCONTINUED | OUTPATIENT
Start: 2023-04-17 | End: 2023-04-22 | Stop reason: HOSPADM

## 2023-04-17 RX ORDER — MEMANTINE HYDROCHLORIDE 10 MG/1
10 TABLET ORAL DAILY
Status: DISCONTINUED | OUTPATIENT
Start: 2023-04-17 | End: 2023-04-22 | Stop reason: HOSPADM

## 2023-04-17 RX ORDER — CITALOPRAM 20 MG/1
20 TABLET ORAL DAILY
COMMUNITY

## 2023-04-17 RX ORDER — MEMANTINE HYDROCHLORIDE 10 MG/1
10 TABLET ORAL 2 TIMES DAILY
COMMUNITY

## 2023-04-17 RX ORDER — UBIDECARENONE 75 MG
50 CAPSULE ORAL DAILY
COMMUNITY

## 2023-04-17 RX ORDER — PHENOL 1.4 %
600 AEROSOL, SPRAY (ML) MUCOUS MEMBRANE DAILY
COMMUNITY

## 2023-04-17 RX ORDER — GABAPENTIN 300 MG/1
300 CAPSULE ORAL 3 TIMES DAILY
Status: ON HOLD | COMMUNITY
End: 2023-04-17

## 2023-04-17 RX ORDER — CITALOPRAM 20 MG/1
20 TABLET ORAL DAILY
Status: DISCONTINUED | OUTPATIENT
Start: 2023-04-17 | End: 2023-04-22 | Stop reason: HOSPADM

## 2023-04-17 RX ORDER — DONEPEZIL HYDROCHLORIDE 10 MG/1
10 TABLET, FILM COATED ORAL 2 TIMES DAILY
COMMUNITY

## 2023-04-17 RX ORDER — DIVALPROEX SODIUM 125 MG/1
125 TABLET, DELAYED RELEASE ORAL DAILY
COMMUNITY

## 2023-04-17 RX ORDER — DIVALPROEX SODIUM 125 MG/1
125 TABLET, DELAYED RELEASE ORAL DAILY
Status: DISCONTINUED | OUTPATIENT
Start: 2023-04-17 | End: 2023-04-22 | Stop reason: HOSPADM

## 2023-04-17 RX ORDER — SACCHAROMYCES BOULARDII 250 MG
250 CAPSULE ORAL 2 TIMES DAILY
Status: DISCONTINUED | OUTPATIENT
Start: 2023-04-17 | End: 2023-04-22 | Stop reason: HOSPADM

## 2023-04-17 RX ORDER — UBIDECARENONE 75 MG
50 CAPSULE ORAL DAILY
Status: DISCONTINUED | OUTPATIENT
Start: 2023-04-17 | End: 2023-04-22 | Stop reason: HOSPADM

## 2023-04-17 RX ORDER — GABAPENTIN 300 MG/1
300 CAPSULE ORAL 3 TIMES DAILY
Status: DISCONTINUED | OUTPATIENT
Start: 2023-04-17 | End: 2023-04-17

## 2023-04-17 RX ORDER — GINGER ROOT/GINGER ROOT EXT 262.5 MG
1 CAPSULE ORAL DAILY
Status: DISCONTINUED | OUTPATIENT
Start: 2023-04-17 | End: 2023-04-22 | Stop reason: HOSPADM

## 2023-04-17 RX ORDER — POTASSIUM CHLORIDE 7.45 MG/ML
10 INJECTION INTRAVENOUS
Status: COMPLETED | OUTPATIENT
Start: 2023-04-17 | End: 2023-04-17

## 2023-04-17 RX ADMIN — Medication 10 ML: at 12:19

## 2023-04-17 RX ADMIN — DONEPEZIL HYDROCHLORIDE 10 MG: 10 TABLET ORAL at 21:40

## 2023-04-17 RX ADMIN — Medication 10 ML: at 21:41

## 2023-04-17 RX ADMIN — HYDROMORPHONE HYDROCHLORIDE 0.5 MG: 1 INJECTION, SOLUTION INTRAMUSCULAR; INTRAVENOUS; SUBCUTANEOUS at 10:48

## 2023-04-17 RX ADMIN — HYDROMORPHONE HYDROCHLORIDE 0.5 MG: 1 INJECTION, SOLUTION INTRAMUSCULAR; INTRAVENOUS; SUBCUTANEOUS at 18:48

## 2023-04-17 RX ADMIN — Medication 250 MG: at 21:40

## 2023-04-17 RX ADMIN — POTASSIUM CHLORIDE 10 MEQ: 7.46 INJECTION, SOLUTION INTRAVENOUS at 06:55

## 2023-04-17 RX ADMIN — ACETAMINOPHEN 325MG 650 MG: 325 TABLET ORAL at 21:40

## 2023-04-17 RX ADMIN — POTASSIUM CHLORIDE 10 MEQ: 7.46 INJECTION, SOLUTION INTRAVENOUS at 12:19

## 2023-04-17 RX ADMIN — MEMANTINE 10 MG: 10 TABLET ORAL at 18:14

## 2023-04-17 RX ADMIN — CITALOPRAM HYDROBROMIDE 20 MG: 20 TABLET ORAL at 18:14

## 2023-04-17 RX ADMIN — CEFTRIAXONE 1 G: 1 INJECTION, POWDER, FOR SOLUTION INTRAMUSCULAR; INTRAVENOUS at 21:41

## 2023-04-17 RX ADMIN — POTASSIUM CHLORIDE 10 MEQ: 7.46 INJECTION, SOLUTION INTRAVENOUS at 09:15

## 2023-04-17 RX ADMIN — POTASSIUM CHLORIDE 10 MEQ: 7.46 INJECTION, SOLUTION INTRAVENOUS at 14:23

## 2023-04-17 RX ADMIN — Medication 5000 UNITS: at 18:14

## 2023-04-17 RX ADMIN — HYDROMORPHONE HYDROCHLORIDE 0.5 MG: 1 INJECTION, SOLUTION INTRAMUSCULAR; INTRAVENOUS; SUBCUTANEOUS at 04:16

## 2023-04-17 NOTE — PROGRESS NOTES
"                  Clinical Nutrition     Nutrition Support Assessment  Reason for Visit: Identified at risk by screening criteria, \"Unsure\" unintentional weight loss      Patient Name: Theresa Barrow  YOB: 1950  MRN: [id number]  Date of Encounter: 04/17/23 14:23 EDT  Admission date: 4/16/2023    Comments:    Nutrition Assessment   Admission Diagnosis:  Accidental fall from bed, initial encounter [W06.XXXA]    Problem List:    Accidental fall from bed, initial encounter    Dementia    CKD (chronic kidney disease) stage 3, GFR 30-59 ml/min    HLD (hyperlipidemia)    OA (osteoarthritis)    UTI (urinary tract infection)    Pubic ramus fracture    Sacral fracture    Hypokalemia      PMH:   She  has a past medical history of Arthritis, Chronic kidney disease, Depression, and Urinary tract infection.    PSH:  She  has no past surgical history on file.    Applicable Nutrition Concerns:   Skin:   Oral:  GI:    Applicable Interval History:     Reported/Observed/Food/Nutrition Related History:   4/17  Sleeping at time of visit, opened eyes when RD called name, however she did not respond. Patient with history of advance dementia and is total care.  is the patient caregiver.   not in room at time of visit.  Attempt to call , no answer.     Labs    Labs Reviewed: Yes     Results from last 7 days   Lab Units 04/17/23  0510 04/16/23  1639   GLUCOSE mg/dL 112* 140*   BUN mg/dL 23 18   CREATININE mg/dL 1.34* 1.07*   SODIUM mmol/L 142 141   CHLORIDE mmol/L 104 103   POTASSIUM mmol/L 3.4* 3.4*   MAGNESIUM mg/dL 2.0  --    ALT (SGPT) U/L  --  15       Results from last 7 days   Lab Units 04/16/23  1639   ALBUMIN g/dL 3.4*         No results found for: HGBA1C          Medications    Medications Reviewed: Yes  Pertinent: yes   Infusion:  PRN:     Intake/Ouptut 24 hrs (0701 - 0700)   I&O's Reviewed: Yes       Anthropometrics     Flowsheet Rows    Flowsheet Row First Filed Value   Admission Height 162.6 " "cm (64\") Documented at 04/16/2023 1559   Admission Weight 63.5 kg (140 lb) Documented at 04/16/2023 1559        Height: Height: 162.6 cm (64\")  Last Filed Weight: Weight: 60.3 kg (133 lb) (04/16/23 2123)  Method: Weight Method: Bed scale  BMI: BMI (Calculated): 22.8  BMI classification: Normal: 18.5-24.9kg/m2  IBW:  120lb    UBW:    Weight      Weight (kg) Weight (lbs) Weight Method   3/9/2023   Stated    4/16/2023 63.504 kg  140 lb  Estimated     60.328 kg  133 lb  Bed scale      Weight change:     Nutrition Focused Physical Exam     Date: 4/17    Unable to perform exam due to: Pt unable to participate at time of visit  No family at bedside to give consent.     Current Nutrition Prescription     PO: Diet: Regular/House Diet; Texture: Regular Texture (IDDSI 7); Fluid Consistency: Thin (IDDSI 0)  Oral Nutrition Supplement:     Intake: Insufficient data  100% x 1 meal     Nutrition Diagnosis   Date: 4/17 Updated:   Problem Predicted suboptimal energy intake   Etiology Altered mental; advance dementia    Signs/Symptoms insuf intake    Status:     Goal:   General: Nutrition support treatment  PO: Establish PO    Nutrition Intervention      Follow treatment progress, Care plan reviewed     - Complete MSA when appropriate   - Monitor intake.       Monitoring/Evaluation:   Per protocol, I&O, Pertinent labs, Weight, POC/GOC      Catalina Clancy RD  Time Spent: 25min    "

## 2023-04-17 NOTE — PROGRESS NOTES
Continued Stay Note  [facility]     Patient Name: Theresa Barrow  MRN: [id number]  Today's Date: 4/17/2023    Admit Date: 4/16/2023    Plan: TBD   Discharge Plan     Row Name 04/17/23 1631       Plan    Plan TBD    Patient/Family in Agreement with Plan yes    Plan Comments F/u visit made w/ the pt.’s daughter, Wendy, to discuss hospice services. Hospice related questions/concerns answered/addressed. Writer also explained the barriers to the pt being able to transition to STR. Wendy does understand that it is likely that the pt will be denied for STR by [facility]. Wendy states that they would still want to have some type of PT for the pt. Writer did educate on HH services as the PT that the pt could have w/ would not likely be enough as they are hopeful that the pt can return to baseline w/ her mobility. Wendy does have the 24-hr. hospice number & is aware that they can call hospice anytime to have a RN come to the home or SNF to assess the pt. Pt.’s care team has been updated via Epic message. Hospice will continue to follow. If further assistance is needed, please call [phone].               Discharge Codes    No documentation.               Expected Discharge Date and Time     Expected Discharge Date Expected Discharge Time    Apr 17, 2023             Georgina Monson

## 2023-04-17 NOTE — CASE MANAGEMENT/SOCIAL WORK
Discharge Planning Assessment  [facility]     Patient Name: Theresa Barrow  MRN: [id number]  Today's Date: 4/17/2023    Admit Date: 4/16/2023    Plan: Initial Discharge Plan Assessment   Discharge Needs Assessment     Row Name 04/17/23 0942       Living Environment    People in Home spouse    Name(s) of People in Home Chris/ cares for pt completely. She ambulates on her own, but dependent for everything else. Incontinent for Bowel and Bladder    Current Living Arrangements home    Primary Care Provided by spouse/significant other    Provides Primary Care For no one    Family Caregiver if Needed spouse    Quality of Family Relationships helpful;involved;supportive    Able to Return to Prior Arrangements yes       Transition Planning    Patient/Family Anticipates Transition to other (see comments)  Home with Hospice    Transportation Anticipated family or friend will provide       Discharge Needs Assessment    Equipment Currently Used at Home shower chair               Discharge Plan     Row Name 04/17/23 0944       Plan    Plan Initial Discharge Plan Assessment    Patient/Family in Agreement with Plan yes    Plan Comments Pt has dementia. I spoke with pt's , Chris, on the phone for IDP. Pt lives with Chris in [facility] Completely dependent for ADL's. Incontinent for bowel and bladder. Ambulates independently. Spouse feeds her, changes her, assists her when walking. He states that his daughters help bathe her weekly. Chris states that he takes his wife to an adult  in [facility], 2 days per week, 4 hrs per day. We discussed Home with Hospice and all that they can offer. Pt's spouse states he would like to speak with Hospice. Orders are in [employer]. I updated Dr. Tomas through messaging. I left a voicemail for Hospice team. No DME/HH/OPPT. PCP-Dr. Mukul Fontaine. Confirmed [employer] Medicare Replacement. D/C plan is Home with Hospice.  will continue to follow.    Final Discharge  Disposition Code 30 - still a patient              Continued Care and Services - Admitted Since 4/16/2023    Coordination has not been started for this encounter.       Expected Discharge Date and Time     Expected Discharge Date Expected Discharge Time    Apr 17, 2023          Demographic Summary     Row Name 04/17/23 0942       General Information    Reason for Consult discharge planning       Contact Information    Permission Granted to Share Info With                Functional Status     Row Name 04/17/23 0942       Functional Status    Usual Activity Tolerance moderate       Functional Status, IADL    Medications completely dependent    Meal Preparation completely dependent    Housekeeping completely dependent    Laundry completely dependent    Shopping completely dependent    IADL Comments Pt ambulates independently               Psychosocial    No documentation.                Abuse/Neglect    No documentation.                Legal    No documentation.                Substance Abuse    No documentation.                Patient Forms    No documentation.                   Irina Pacheco RN

## 2023-04-17 NOTE — PLAN OF CARE
Goal Outcome Evaluation:  Plan of Care Reviewed With: patient, spouse           Outcome Evaluation: PT eval completed. Patient alert, disoriented x4. Presenting with non-displaced fracture of sacral ala and L superior pubic ramus, LLE protected WBAT. Demonstrating significant deficits in functional mobility limited by pain, cognitive status, LE strength effecting functional mobility below baseline. Patient will benefit from skilled IP PT services to address impairments in order to return to PLOF. Recommend SNF at [place].

## 2023-04-17 NOTE — PROGRESS NOTES
[facility]  PROGRESS NOTE    Patient Name: Theresa Barrow  : 1950  MRN: [id number]    Date of Admission: 2023  Primary Care Physician: Mukul Fontaine MD    Subjective   Subjective     CC:  Debility, fall    HPI:  No events.  Pleasantly demented baseline.     ROS:  Unable to obtain    Objective   Objective     Vital Signs:   Temp:  [97.8 °F (36.6 °C)-99.4 °F (37.4 °C)] 98.6 °F (37 °C)  Heart Rate:  [65-82] 76  Resp:  [18] 18  BP: ()/() 128/73  Flow (L/min):  [3] 3     Physical Exam:  Constitutional: No acute distress, asleep quietly  HENT: Mucous membranes moist  Respiratory: Good effort, nonlabored respirations on 3 L   cardiovascular: NSR tele  Gastrointestinal: Soft, nondistended  Musculoskeletal: No overt peripheral edema, normal muscle tone for age      Results Reviewed:  LAB RESULTS:      Lab 23  0510 23  1639   WBC 11.59* 14.83*   HEMOGLOBIN 9.7* 10.3*   HEMATOCRIT 30.6* 31.6*   PLATELETS 299 330   NEUTROS ABS 8.25* 12.48*   IMMATURE GRANS (ABS) 0.09* 0.08*   LYMPHS ABS 1.62 0.92   MONOS ABS 1.23* 1.17*   EOS ABS 0.32 0.09   MCV 98.7* 96.0   PROTIME  --  14.2   APTT  --  28.1*         Lab 23  0510 23  1639   SODIUM 142 141   POTASSIUM 3.4* 3.4*   CHLORIDE 104 103   CO2 25.0 26.0   ANION GAP 13.0 12.0   BUN 23 18   CREATININE 1.34* 1.07*   EGFR 41.4* 54.3*   GLUCOSE 112* 140*   CALCIUM 8.5* 8.6   MAGNESIUM 2.0  --          Lab 23  1639   TOTAL PROTEIN 6.3   ALBUMIN 3.4*   GLOBULIN 2.9   ALT (SGPT) 15   AST (SGOT) 26   BILIRUBIN 0.6   ALK PHOS 114         Lab 23  1639   PROTIME 14.2   INR 1.10             Lab 23  1824 23  1711   ABO TYPING A A   RH TYPING Positive Positive   ANTIBODY SCREEN  --  Negative         Brief Urine Lab Results  (Last result in the past 365 days)      Color   Clarity   Blood   Leuk Est   Nitrite   Protein   CREAT   Urine HCG        23 1709 Yellow   Turbid    Moderate (2+)   Large (3+)   Positive   100 mg/dL (2+)                 Microbiology Results Abnormal     None          CT Pelvis Without Contrast    Result Date: 4/16/2023  CT PELVIS WO CONTRAST Date of Exam: 4/16/2023 5:36 PM EDT Indication: fall from bed with significant left hip/pelvis pain. Comparison: Radiograph 4/16/2023 Technique: Axial CT images were obtained of the pelvis without contrast administration.  Reconstructed coronal and sagittal images were also obtained. Automated exposure control and iterative construction methods were used. Findings: There is some minimal buckling of the superior pubic ramus on the left consistent with a nondisplaced fracture. This is not seen even in retrospect on prior radiograph. There is also buckling of the left sacral ala consistent with a nondisplaced fracture. The sacroiliac joints appear intact. There is mild narrowing of the hip joint spaces bilaterally. Left proximal femur appears intact. Visualized portions the right femur also appear intact. Soft tissues of the pelvis appear within normal limits. No free intraperitoneal fluid. No pelvic adenopathy. There are multiple colonic diverticula.     Impression: Impression: 1. Acute nondisplaced fractures involving the left superior pubic ramus near the midline. There is also an acute nondisplaced fracture of the left sacral ala. Sacral iliac joint is intact. Electronically Signed: Vick Enamorado  4/16/2023 6:09 PM EDT  Workstation ID: [id number]    XR Hip With or Without Pelvis 2 - 3 View Left    Result Date: 4/16/2023  XR HIP W OR WO PELVIS 2-3 VIEW LEFT Date of Exam: 4/16/2023 4:07 PM EDT Indication: fall from bed. Comparison: 3/9/2023 Findings: There is diffuse osteopenia. There is no acute fracture or dislocation. Hip joint spaces appear within normal limits and symmetrical bilaterally. Soft tissues are unremarkable.     Impression: Impression: 1. Osteopenia. No acute bony abnormality of the pelvis or left hip.  Electronically Signed: Vick Enamorado  4/16/2023 4:30 PM EDT  Workstation ID: [id number]          Current medications:  Scheduled Meds:cefTRIAXone, 1 g, Intravenous, Q24H  sodium chloride, 10 mL, Intravenous, Q12H      Continuous Infusions:   PRN Meds:.•  acetaminophen **OR** acetaminophen **OR** acetaminophen  •  HYDROmorphone  •  ondansetron **OR** ondansetron  •  Potassium Replacement - Follow Nurse / BPA Driven Protocol  •  [COMPLETED] Insert Peripheral IV **AND** sodium chloride  •  sodium chloride  •  sodium chloride    Assessment & Plan   Assessment & Plan     Active Hospital Problems    Diagnosis  POA   • **Accidental fall from bed, initial encounter [W06.XXXA]  Yes   • Dementia [F03.90]  Unknown   • CKD (chronic kidney disease) stage 3, GFR 30-59 ml/min [N18.30]  Unknown   • HLD (hyperlipidemia) [E78.5]  Unknown   • OA (osteoarthritis) [M19.90]  Unknown   • UTI (urinary tract infection) [N39.0]  Unknown   • Pubic ramus fracture [S32.599A]  Unknown   • Sacral fracture [S32.10XA]  Unknown   • Hypokalemia [E87.6]  Unknown      Resolved Hospital Problems   No resolved problems to display.        Brief Hospital Course to date:  Theresa Barrow is a 73 y.o. female PMH of CKD3, HLD, OA and dementia with advancing debility presents to the ED after a fall.      Fall  Pubic Ramus Fracture  Sacral Fracture  -CT pelvis shows acute nondisplaced fractures involving the left superior pubic ramus and acute nondisplaced fracture of the left sacral ala  -Pain control  -Conservative management     UTI  -Hx of klebsiella culture last month  -Rocephin  -Pending urine culture = >100k cfu GNR     Anemia  -Hgb 10.3, appears baseline     Hypokalemia  -K 3.4  -Replace per protocol  -Recheck in AM     CKD3  -Cr 1.07, improved from baseline  -Avoid nephrotoxic agents  -Strict I/Os     Dementia  -Fall precautions  -Pending med rec information.......      · GOALS OF CARE:   feels unsafe to care for in home any longer given her  advanced de,entia and functional decline, he is unable to safely transfer or transport patient anymore.  HOSPICE consulted for consideration of option to remain home but convert to full comfort measures vs. Also considering placing in long term care environment.   Will LIMIT LABS, goal of stay is treat UTI and get patient comfortable while arrange long term disposition with  for discharge (home hospice vs long term care facility).     Expected Discharge Location and Transportation:  Home with hospice vs LTC   Expected Discharge   Expected Discharge Date and Time     Expected Discharge Date Expected Discharge Time    Apr 17, 2023            DVT prophylaxis:  Mechanical DVT prophylaxis orders are present.     AM-PAC 6 Clicks Score (PT): 8 (04/17/23 1154)    CODE STATUS:   Code Status and Medical Interventions:   Ordered at: 04/16/23 2003     Medical Intervention Limits:    NO intubation (DNI)     Code Status (Patient has no pulse and is not breathing):    No CPR (Do Not Attempt to Resuscitate)     Medical Interventions (Patient has pulse or is breathing):    Limited Support       Genet Tomas MD  04/17/23

## 2023-04-17 NOTE — CONSULTS
Hospice consult received. Chart reviewed. BS visit made w/ spouse. Pt. was lying in bed asleep. Hospice services/POC/philosophy explained. Hospice related questions/concerns answered/addressed. Spouse states that he has spoken w/ their daughter, Wendy, & the pt.’s sister about hospice services, but not in detail. Spouse states that when the pt. arrived in the ED he was told that the pt. would need rehab. Spouse also states that PT saw the pt & recommended STR. Writer did explain that if they were considering STR that the hospital CM would assist w/ the process, but writer explained that w/ the pt.’s [employer] Medicare Replacement plan, the pt would have to be approved & that the pt also could be denied. Writer did explain that she would update the hospital CM would be the person to address rehab w/ him. Spouse states that he would like to have writer return later this afternoon when his daughter & sister-in-law arrive @ the hospital. Writer agreeable. Message has been sent to the pt.’s CMAlyson.

## 2023-04-17 NOTE — THERAPY EVALUATION
Patient Name: Theresa Barrow  : 1950    MRN: [id number]                              Today's Date: 2023       Admit Date: 2023    Visit Dx:     ICD-10-CM ICD-9-CM   1. Accidental fall from bed, initial encounter  W06.XXXA E884.4   2. Multiple closed fractures of pelvis without disruption of pelvic ring, initial encounter  S32.82XA 808.44   3. Severe Alzheimer's dementia, unspecified timing of dementia onset, unspecified whether behavioral, psychotic, or mood disturbance or anxiety  G30.9 331.0    F02.C0 294.10   4. Bacteriuria with pyuria  R82.71 791.9    R82.81      Patient Active Problem List   Diagnosis   • Accidental fall from bed, initial encounter   • Dementia   • CKD (chronic kidney disease) stage 3, GFR 30-59 ml/min   • HLD (hyperlipidemia)   • OA (osteoarthritis)   • UTI (urinary tract infection)   • Pubic ramus fracture   • Sacral fracture   • Hypokalemia     Past Medical History:   Diagnosis Date   • Arthritis    • Chronic kidney disease    • Depression    • Urinary tract infection      History reviewed. No pertinent surgical history.   General Information     Row Name 23 1139          Physical Therapy Time and Intention    Document Type evaluation  -LO     Mode of Treatment individual therapy;physical therapy  -LO     Row Name 23 1139          General Information    Patient Profile Reviewed yes  -LO     Prior Level of Function independent:;gait;dependent:;bathing;dressing;grooming;feeding;ADL's  Per spouse, was dependent for everything except ambulation ( ambulated and performed stair navigation independently)  -LO     Existing Precautions/Restrictions fall;other (see comments);left;hip  very advanced dementia, incontinant for bowel and bladder, acute L superior pubic ramus fx,L sacral ala fx ( protected WBAT)  -LO     Barriers to Rehab medically complex;cognitive status;previous functional deficit  -LO     Row Name 23 1139          Living Environment    People in Home  spouse  -LO     Row Name 04/17/23 1139          Home Main Entrance    Number of Stairs, Main Entrance none  -LO     Row Name 04/17/23 1139          Stairs Within Home, Primary    Stairs, Within Home, Primary enters home through garage ( no steps) can remain on this level if needed. Otherwise 7 steps up to bedrooms and 5 steps down to lower level  -LO     Number of Stairs, Within Home, Primary other (see comments)  see above  -LO     Row Name 04/17/23 1139          Cognition    Orientation Status (Cognition) disoriented to;person;place;situation;time  -LO     Row Name 04/17/23 1139          Safety Issues, Functional Mobility    Safety Issues Affecting Function (Mobility) ability to follow commands;at risk behavior observed;awareness of need for assistance;friction/shear risk;insight into deficits/self-awareness;positioning of assistive device;safety precaution awareness;unable to maintain weight-bearing restrictions;safety precautions follow-through/compliance;sequencing abilities  -     Impairments Affecting Function (Mobility) balance;coordination;pain;strength;shortness of breath  -     Comment, Safety Issues/Impairments (Mobility) tends to yell out with any assist of movement. hempful to have spouse present  -           User Key  (r) = Recorded By, (t) = Taken By, (c) = Cosigned By    Initials Name Provider Type    Mary Jane Ellis PT Physical Therapist               Mobility     Row Name 04/17/23 1146          Bed Mobility    Bed Mobility supine-sit  -LO     Supine-Sit [place] (Bed Mobility) maximum assist (25% patient effort);2 person assist;verbal cues;nonverbal cues (demo/gesture)  -     Assistive Device (Bed Mobility) draw sheet;head of bed elevated  -     Comment, (Bed Mobility) Mostly use of draw sheet, assist at trunk and LEs  -     Row Name 04/17/23 1146          Transfers    Comment, (Transfers) EOB<>stand with bed height elevated maxAx2  -     Row Name 04/17/23 1146           Bed-Chair Transfer    Bed-Chair [place] (Transfers) not tested  -     Row Name 04/17/23 1146          Sit-Stand Transfer    Sit-Stand [place] (Transfers) maximum assist (25% patient effort);2 person assist;verbal cues;nonverbal cues (demo/gesture)  -     Row Name 04/17/23 1146          Gait/Stairs (Locomotion)    [place] Level (Gait) not tested  -     [place] Level (Stairs) not tested  -     Comment, (Gait/Stairs) Not safe on this date to attempt  -           User Key  (r) = Recorded By, (t) = Taken By, (c) = Cosigned By    Initials Name Provider Type    Mary Jane Ellis, PT Physical Therapist               Obj/Interventions     Row Name 04/17/23 1147          Range of Motion Comprehensive    General Range of Motion bilateral lower extremity ROM WNL  -[facility] Name 04/17/23 1147          Strength Comprehensive (MMT)    General Manual Muscle Testing (MMT) Assessment lower extremity strength deficits identified  -     Comment, General Manual Muscle Testing (MMT) Assessment RLE grossly 3+/5, LLE grossly 3-/5 pain limited  -     Row Name 04/17/23 1147          Balance    Balance Assessment sitting static balance;sitting dynamic balance;standing static balance  -     Static Sitting Balance minimal assist  -LO     Position, Sitting Balance unsupported;sitting edge of bed  -LO     Static Standing Balance maximum assist;2-person assist;verbal cues;other (see comments)  BUE support  -     Comment, Balance Leans onto R side in sitting EOB, [place] for safety. maxA x2 for safety in standing with BUE support  -     Row Name 04/17/23 1147          Sensory Assessment (Somatosensory)    Sensory Assessment (Somatosensory) unable/difficult to assess  -           User Key  (r) = Recorded By, (t) = Taken By, (c) = Cosigned By    Initials Name Provider Type    Mary Jane Ellis, PT Physical Therapist               Goals/Plan     Row Name 04/17/23 1153          Bed Mobility Goal 1 (PT)     Activity/Assistive Device (Bed Mobility Goal 1, PT) rolling to left;rolling to right;scooting;sit to supine;supine to sit  -LO     [place] Level/Cues Needed (Bed Mobility Goal 1, PT) minimum assist (75% or more patient effort)  -LO     Time Frame (Bed Mobility Goal 1, PT) long term goal (LTG);10 days  -LO     Row Name 04/17/23 1153          Transfer Goal 1 (PT)    Activity/Assistive Device (Transfer Goal 1, PT) sit-to-stand/stand-to-sit  -LO     [place] Level/Cues Needed (Transfer Goal 1, PT) moderate assist (50-74% patient effort)  -LO     Time Frame (Transfer Goal 1, PT) long term goal (LTG);10 days  -     Row Name 04/17/23 1153          Gait Training Goal 1 (PT)    Activity/Assistive Device (Gait Training Goal 1, PT) gait (walking locomotion);forward stepping;decrease fall risk;increase endurance/gait distance;improve balance and speed;decrease asymmetrical patterns;walker, rolling  -LO     [place] Level (Gait Training Goal 1, PT) moderate assist (50-74% patient effort)  -LO     Distance (Gait Training Goal 1, PT) 10  -LO     Time Frame (Gait Training Goal 1, PT) long term goal (LTG);10 days  -     Row Name 04/17/23 1157          Therapy Assessment/Plan (PT)    Planned Therapy Interventions (PT) balance training;bed mobility training;gait training;home exercise program;patient/family education;neuromuscular re-education;strengthening;transfer training  -LO           User Key  (r) = Recorded By, (t) = Taken By, (c) = Cosigned By    Initials Name Provider Type    Mary Jane Ellis, PT Physical Therapist               Clinical Impression     Row Name 04/17/23 1149          Pain    Additional Documentation Pain Scale: FACES Pre/Post-Treatment (Group)  -     Row Name 04/17/23 4858          Pain Scale: FACES Pre/Post-Treatment    Pain: FACES Scale, Pretreatment 0-->no hurt  -LO     Posttreatment Pain Rating 6-->hurts even more  -LO     Pain Location - Side/Orientation Left  -LO     Pain Location -  hip  -LO     Pre/Posttreatment Pain Comment grimace and yell out with movement  -LO     Row Name 04/17/23 1148          Plan of Care Review    Plan of Care Reviewed With patient;spouse  -     Outcome Evaluation PT eval completed. Patient alert, disoriented x4. Presenting with non-displaced fracture of sacral ala and L superior pubic ramus, LLE protected WBAT. Demonstrating significant deficits in functional mobility limited by pain, cognitive status, LE strength effecting functional mobility below baseline. Patient will benefit from skilled IP PT services to address impairments in order to return to PLOF. Recommend SNF at [place].  -     Row Name 04/17/23 1148          Therapy Assessment/Plan (PT)    Patient/Family Therapy Goals Statement (PT) return to PLOF  -LO     Rehab Potential (PT) fair, will monitor progress closely  -     Criteria for Skilled Interventions Met (PT) yes;meets criteria;skilled treatment is necessary  -LO     Therapy Frequency (PT) daily  -LO     Row Name 04/17/23 1148          Vital Signs    Pre Systolic BP Rehab 136  -LO     Pre Treatment Diastolic BP 81  -LO     Pretreatment Heart Rate (beats/min) 72  -LO     Pre SpO2 (%) 95  -LO     O2 Delivery Pre Treatment nasal cannula  -LO     O2 Delivery Intra Treatment nasal cannula  -LO     O2 Delivery Post Treatment nasal cannula  -LO     Pre Patient Position Supine  -LO     Intra Patient Position Standing  -LO     Post Patient Position Supine  -LO     Row Name 04/17/23 1148          Positioning and Restraints    Pre-Treatment Position in bed  -LO     Post Treatment Position bed  -LO     In Bed notified nsg;supine;exit alarm on;encouraged to call for assist;call light within reach;fowlers;with family/caregiver  -           User Key  (r) = Recorded By, (t) = Taken By, (c) = Cosigned By    Initials Name Provider Type    Mary Jane Ellis, PT Physical Therapist               Outcome Measures     Row Name 04/17/23 1156          How much help from  another person do you currently need...    Turning from your back to your side while in flat bed without using bedrails? 1  -LO     Moving from lying on back to sitting on the side of a flat bed without bedrails? 1  -LO     Moving to and from a bed to a chair (including a wheelchair)? 2  -LO     Standing up from a chair using your arms (e.g., wheelchair, bedside chair)? 2  -LO     Climbing 3-5 steps with a railing? 1  -LO     To walk in hospital room? 1  -LO     AM-PAC 6 Clicks Score (PT) 8  -LO     Highest level of mobility 3 --> Sat at edge of bed  -LO     Row Name 04/17/23 1154          Functional Assessment    Outcome Measure Options AM-PAC 6 Clicks Basic Mobility (PT)  -           User Key  (r) = Recorded By, (t) = Taken By, (c) = Cosigned By    Initials Name Provider Type    Mary Jane Ellis, PT Physical Therapist                             Physical Therapy Education     Title: PT OT SLP Therapies (Done)     Topic: Physical Therapy (Done)     Point: Mobility training (Done)     Learning Progress Summary           Patient Acceptance, E, VU,NR by  at 4/17/2023 1041    Comment: PT POC, CG use of gait belt for home   Family Acceptance, E, VU,NR by  at 4/17/2023 1041    Comment: PT POC, CG use of gait belt for home                   Point: Home exercise program (Done)     Learning Progress Summary           Patient Acceptance, E, VU,NR by  at 4/17/2023 1041    Comment: PT POC, CG use of gait belt for home   Family Acceptance, E, VU,NR by  at 4/17/2023 1041    Comment: PT POC, CG use of gait belt for home                   Point: Body mechanics (Done)     Learning Progress Summary           Patient Acceptance, E, VU,NR by  at 4/17/2023 1041    Comment: PT POC, CG use of gait belt for home   Family Acceptance, E, VU,NR by  at 4/17/2023 1041    Comment: PT POC, CG use of gait belt for home                   Point: Precautions (Done)     Learning Progress Summary           Patient Acceptance, E, VU,NR by  LO at 4/17/2023 1041    Comment: PT POC, CG use of gait belt for home   Family Acceptance, E, VU,NR by LO at 4/17/2023 1041    Comment: PT POC, CG use of gait belt for home                               User Key     Initials Effective Dates Name Provider Type Discipline    KRISTEN 06/16/21 -  Mary Jane Avila, PT Physical Therapist PT              PT Recommendation and Plan  Planned Therapy Interventions (PT): balance training, bed mobility training, gait training, home exercise program, patient/family education, neuromuscular re-education, strengthening, transfer training  Plan of Care Reviewed With: patient, spouse  Outcome Evaluation: PT eval completed. Patient alert, disoriented x4. Presenting with non-displaced fracture of sacral ala and L superior pubic ramus, LLE protected WBAT. Demonstrating significant deficits in functional mobility limited by pain, cognitive status, LE strength effecting functional mobility below baseline. Patient will benefit from skilled IP PT services to address impairments in order to return to PLOF. Recommend SNF at [place].     Time Calculation:    PT Charges     Row Name 04/17/23 1041             Time Calculation    Start Time 1041  -LO      PT Received On 04/17/23  -LO      PT Goal Re-Cert Due Date 04/27/23  -LO         Timed Charges    45733 - PT Therapeutic Activity Minutes 25  -LO         Untimed Charges    PT Eval/Re-eval Minutes 45  -LO         Total Minutes    Timed Charges Total Minutes 25  -LO      Untimed Charges Total Minutes 45  -LO       Total Minutes 70  -LO            User Key  (r) = Recorded By, (t) = Taken By, (c) = Cosigned By    Initials Name Provider Type     Mary Jane Avila, PT Physical Therapist              Therapy Charges for Today     Code Description Service Date Service Provider Modifiers Qty    88141736145 HC PT THERAPEUTIC ACT EA 15 MIN 4/17/2023 Mary Jane Avila, PT GP 2    59775724933 HC PT EVAL LOW COMPLEXITY 4 4/17/2023 Mary Jane Avila, PT GP 1          PT  G-Codes  Outcome Measure Options: AM-PAC 6 Clicks Basic Mobility (PT)  AM-PAC 6 Clicks Score (PT): 8  PT Discharge Summary  Anticipated Discharge Disposition (PT): skilled nursing facility    Mary Jane Avila, PT  4/17/2023

## 2023-04-17 NOTE — PLAN OF CARE
Goal Outcome Evaluation:      The patient arrived to the floor, seemingly, in severe pain. She is unable to answer questions but does express her discomfort nonverbally. Pain meds were administered, she is resting now. Requiring 3LNC with sats in the low 90s, baseline room air. Her  was unable to remember her home medications but he said he would bring a list tomorrow morning.

## 2023-04-17 NOTE — DISCHARGE PLACEMENT REQUEST
"Theresa Larson (73 y.o. Female)   Referring MD: Dr. Genet Tomas  PCP: Mukul Fontaine  Hospice Dx: Alzheimer's Dementia complicated by a pubic & sacral fx complicated by recurrent UTI's    Date of Birth   1950    Social Security Number       Address   [address] [place] [address]    Home Phone   [phone]    MRN   [id number]       Christianity   None    Marital Status                               Admission Date   4/16/23    Admission Type   Emergency    Admitting Provider   Genet Tomas MD    Attending Provider   Genet Tomas MD    Department, Room/Bed   [facility] 5H, S573/1       Discharge Date       Discharge Disposition       Discharge Destination                               Attending Provider: Genet Tomas MD    Allergies: No Known Allergies    Isolation: None   Infection: None   Code Status: No CPR    Ht: 162.6 cm (64\")   Wt: 60.3 kg (133 lb)    Admission Cmt: None   Principal Problem: Accidental fall from bed, initial encounter [W06.XXXA]                 Active Insurance as of 4/16/2023     Primary Coverage     Payor Plan Insurance Group Employer/Plan Group    HUMANA MEDICARE REPLACEMENT HUMANA MEDICARE REPLACEMENT [id number]     Payor Plan Address Payor Plan Phone Number Payor Plan Fax Number Effective Dates    [address] [address] [phone]  1/1/2023 - None Entered    [facility] [address]       Subscriber Name Subscriber Birth Date Member ID       THERESA LARSON 1950 [id number]                 Emergency Contacts      (Rel.) Home Phone Work Phone Mobile Phone    MARSHANAHUN (Spouse) [phone] -- [phone]    ROD,TAMMY (Daughter) [phone] -- [phone]            Emergency Contact Information     Name Relation Home Work Mobile    NAHUN LARSON Spouse [phone]    RADHIKA ORD Daughter [phone]          Insurance Information                HUMANA MEDICARE REPLACEMENT/HUMANA MEDICARE REPLACEMENT " Phone: [phone]    Subscriber: Theresa Barrow Subscriber#: [id number]    Group#: [id number] Precert#: --          Problem List           Codes Noted - Resolved       Hospital    * (Principal) Accidental fall from bed, initial encounter ICD-10-CM: W06.XXXA  ICD-9-CM: E884.4 2023 - Present    Dementia ICD-10-CM: F03.90  ICD-9-CM: 294.20 2023 - Present    CKD (chronic kidney disease) stage 3, GFR 30-59 ml/min ICD-10-CM: N18.30  ICD-9-CM: 585.3 2023 - Present    HLD (hyperlipidemia) ICD-10-CM: E78.5  ICD-9-CM: 272.4 2023 - Present    OA (osteoarthritis) ICD-10-CM: M19.90  ICD-9-CM: 715.90 2023 - Present    UTI (urinary tract infection) ICD-10-CM: N39.0  ICD-9-CM: 599.0 2023 - Present    Pubic ramus fracture ICD-10-CM: S32.599A  ICD-9-CM: 808.2 2023 - Present    Sacral fracture ICD-10-CM: S32.10XA  ICD-9-CM: 805.6 2023 - Present    Hypokalemia ICD-10-CM: E87.6  ICD-9-CM: 276.8 2023 - Present        History & Physical      Komal Foley APRN at 23 1901     Attestation signed by Luis Guy MD at 23 [phone]      I have reviewed this documentation and agree.  Attending Cosignature     I supervised care of the patient on day of service with direct care provided by the advanced care provider (APC).    Luis Guy MD  23                          [facility] Medicine Services  HISTORY AND PHYSICAL    Patient Name: Theresa Barrow  : 1950  MRN: [id number]  Primary Care Physician: Mukul Fontaine MD  Date of admission: 2023    Subjective    Subjective     Chief Complaint:  Fall    HPI:  Theresa Barrow is a 73 y.o. female with PMH of CKD3, HLD, OA and dementia presents to the ED after a fall. Patient has severe dementia and is total care,  is caregiver. Yesterday, patient fell out of bed. She fell onto her left side and hit her chin on the nightstand. Neighbor was able to help get her off the floor. Today, patient  was able to shuffle to the bathroom with assistance. Patient's  was unable to get her off the toilet so he called EMS. Of note, patient was treated for klebsiella UTI last month. Patient is incontinent and wears depends. Due to this, she has recurrent UTIs.         Review of Systems   Unable to perform ROS: Dementia          Personal History     Past Medical History:   Diagnosis Date   • Arthritis    • Chronic kidney disease    • Depression    • Urinary tract infection              No past surgical history on file.    Family History:  family history includes Cancer in her father; Heart disease in her mother.     Social History:    Social History     Social History Narrative   • Not on file       Medications:       No Known Allergies    Objective    Objective     Vital Signs:   Heart Rate:  [65-82] 76  Resp:  [18] 18  BP: (141-149)/(65-77) 141/77  Flow (L/min):  [3] 3    Physical Exam  Vitals reviewed.   Constitutional:       General: She is not in acute distress.     Appearance: She is normal weight.   HENT:      Head: Normocephalic.      Nose: Nose normal. No congestion.      Mouth/Throat:      Mouth: Mucous membranes are dry.   Eyes:      Extraocular Movements: Extraocular movements intact.      Pupils: Pupils are equal, round, and reactive to light.   Cardiovascular:      Rate and Rhythm: Normal rate and regular rhythm.      Pulses: Normal pulses.      Heart sounds: Normal heart sounds. No murmur heard.  Pulmonary:      Effort: Pulmonary effort is normal. No respiratory distress.      Breath sounds: Normal breath sounds. No wheezing or rhonchi.   Abdominal:      General: Bowel sounds are normal. There is no distension.      Palpations: Abdomen is soft.      Tenderness: There is no abdominal tenderness.   Musculoskeletal:         General: Tenderness (left hip) present.      Cervical back: Normal range of motion. No rigidity.      Right hip: Tenderness present. Decreased range of motion.   Skin:     General:  Skin is warm.      Capillary Refill: Capillary refill takes less than 2 seconds.      Findings: Bruising (chin) present.   Neurological:      General: No focal deficit present.      Mental Status: Mental status is at baseline. She is disoriented.      Motor: Weakness present.   Psychiatric:         Speech: She is noncommunicative.         Behavior: Behavior is uncooperative and withdrawn.         Cognition and Memory: Memory is impaired.            Result Review:  I have personally reviewed the results from the time of this admission to 4/16/2023 19:01 EDT and agree with these findings:  [x]  Laboratory list / accordion  [x]  Microbiology  [x]  Radiology  []  EKG/Telemetry   []  Cardiology/Vascular   []  Pathology  [x]  Old records  []  Other:  Most notable findings include:     LAB RESULTS:      Lab 04/16/23  1639   WBC 14.83*   HEMOGLOBIN 10.3*   HEMATOCRIT 31.6*   PLATELETS 330   NEUTROS ABS 12.48*   IMMATURE GRANS (ABS) 0.08*   LYMPHS ABS 0.92   MONOS ABS 1.17*   EOS ABS 0.09   MCV 96.0   PROTIME 14.2   APTT 28.1*         Lab 04/16/23  1639   SODIUM 141   POTASSIUM 3.4*   CHLORIDE 103   CO2 26.0   ANION GAP 12.0   BUN 18   CREATININE 1.07*   EGFR 54.3*   GLUCOSE 140*   CALCIUM 8.6         Lab 04/16/23  1639   TOTAL PROTEIN 6.3   ALBUMIN 3.4*   GLOBULIN 2.9   ALT (SGPT) 15   AST (SGOT) 26   BILIRUBIN 0.6   ALK PHOS 114         Lab 04/16/23  1639   PROTIME 14.2   INR 1.10             Lab 04/16/23  1711   ABO TYPING A   RH TYPING Positive   ANTIBODY SCREEN Negative         Brief Urine Lab Results  (Last result in the past 365 days)      Color   Clarity   Blood   Leuk Est   Nitrite   Protein   CREAT   Urine HCG        04/16/23 1709 Yellow   Turbid   Moderate (2+)   Large (3+)   Positive   100 mg/dL (2+)               Microbiology Results (last 10 days)     ** No results found for the last 240 hours. **          CT Pelvis Without Contrast    Result Date: 4/16/2023  CT PELVIS WO CONTRAST Date of Exam: 4/16/2023 5:36  PM EDT Indication: fall from bed with significant left hip/pelvis pain. Comparison: Radiograph 4/16/2023 Technique: Axial CT images were obtained of the pelvis without contrast administration.  Reconstructed coronal and sagittal images were also obtained. Automated exposure control and iterative construction methods were used. Findings: There is some minimal buckling of the superior pubic ramus on the left consistent with a nondisplaced fracture. This is not seen even in retrospect on prior radiograph. There is also buckling of the left sacral ala consistent with a nondisplaced fracture. The sacroiliac joints appear intact. There is mild narrowing of the hip joint spaces bilaterally. Left proximal femur appears intact. Visualized portions the right femur also appear intact. Soft tissues of the pelvis appear within normal limits. No free intraperitoneal fluid. No pelvic adenopathy. There are multiple colonic diverticula.     Impression: Impression: 1. Acute nondisplaced fractures involving the left superior pubic ramus near the midline. There is also an acute nondisplaced fracture of the left sacral ala. Sacral iliac joint is intact. Electronically Signed: Vick Enamorado  4/16/2023 6:09 PM EDT  Workstation ID: [id number]    XR Hip With or Without Pelvis 2 - 3 View Left    Result Date: 4/16/2023  XR HIP W OR WO PELVIS 2-3 VIEW LEFT Date of Exam: 4/16/2023 4:07 PM EDT Indication: fall from bed. Comparison: 3/9/2023 Findings: There is diffuse osteopenia. There is no acute fracture or dislocation. Hip joint spaces appear within normal limits and symmetrical bilaterally. Soft tissues are unremarkable.     Impression: Impression: 1. Osteopenia. No acute bony abnormality of the pelvis or left hip. Electronically Signed: Vick Enamorado  4/16/2023 4:30 PM EDT  Workstation ID: [id number]          Assessment & Plan   Assessment & Plan       Accidental fall from bed, initial encounter    Dementia    CKD (chronic kidney disease) stage  3, GFR 30-59 ml/min    HLD (hyperlipidemia)    OA (osteoarthritis)    UTI (urinary tract infection)    Pubic ramus fracture    Sacral fracture    Theresa Barrow is a 73 y.o. female with PMH of CKD3, HLD, OA and dementia presents to the ED after a fall.     Fall  Pubic Ramus Fracture  Sacral Fracture  -CT pelvis shows acute nondisplaced fractures involving the left superior pubic ramus and acute nondisplaced fracture of the left sacral ala  -Pain control  -Consult PT/OT, case management   -Consult ortho    UTI  -Hx of klebsiella culture last month  -Rocephin  -Pending urine culture    Anemia  -Hgb 10.3, appears baseline   -Type and screen  -Trend    Hypokalemia  -K 3.4  -Replace per protocol  -Recheck in AM    CKD3  -Cr 1.07, improved from baseline  -Avoid nephrotoxic agents  -Strict I/Os    Dementia  -Fall precautions  -Pending med rec      DVT prophylaxis:  SCDs    CODE STATUS:  DNR/DNI       Expected Discharge          Signature: Electronically signed by KATIUSKA Gibson, 04/16/23, 7:58 PM EDT.    Total APC Time: 60 minutes                Electronically signed by Luis Guy MD at 04/16/23 2235         Current Facility-Administered Medications   Medication Dose Route Frequency Provider Last Rate Last Admin   • acetaminophen (TYLENOL) tablet 650 mg  650 mg Oral Q4H PRN Komal Foley APRN        Or   • acetaminophen (TYLENOL) 160 MG/5ML solution 650 mg  650 mg Oral Q4H PRN Komal Foley APRN        Or   • acetaminophen (TYLENOL) suppository 650 mg  650 mg Rectal Q4H PRN Komal Foley APRN       • cefTRIAXone (ROCEPHIN) 1 g/100 mL 0.9% NS (MBP)  1 g Intravenous Q24H Komal Foley APRN   1 g at 04/16/23 2040   • HYDROmorphone (DILAUDID) injection 0.5 mg  0.5 mg Intravenous Q4H PRN Luis Guy MD   0.5 mg at 04/17/23 1048   • ondansetron (ZOFRAN) tablet 4 mg  4 mg Oral Q6H PRN Komal Foley APRN        Or   • ondansetron (ZOFRAN) injection 4 mg  4 mg Intravenous Q6H PRN Komal Foley, KATIUSKA       • Potassium  Replacement - Follow Nurse / BPA Driven Protocol   Does not apply PRN Yasmin Rivera PA-C       • sodium chloride 0.9 % flush 10 mL  10 mL Intravenous PRN Hector Ordoñez MD       • sodium chloride 0.9 % flush 10 mL  10 mL Intravenous Q12H [employer], Komal, APRN   10 mL at 04/17/23 1219   • sodium chloride 0.9 % flush 10 mL  10 mL Intravenous PRN [employer], Komal, APRN       • sodium chloride 0.9 % infusion 40 mL  40 mL Intravenous PRN [employer], Komal, APRN            Physician Progress Notes (last 72 hours)      Brad Rico Jr., MD at 04/17/23 0948        I wasn't called regarding this patient, patient was not on my list when I rounded this morning.    Imaging reviewed, plan for nonoperative management, protected weight bearing as tolerated left lower extremity.  Formal consult to follow.  Follow up in clinic 6 weeks.    Electronically signed by Brad Rico Jr., MD at 04/17/23 0980       Consult Notes (last 72 hours)  Notes from 04/14/23 1308 through 04/17/23 1308   No notes of this type exist for this encounter.

## 2023-04-17 NOTE — PROGRESS NOTES
I wasn't called regarding this patient, patient was not on my list when I rounded this morning.    Imaging reviewed, plan for nonoperative management, protected weight bearing as tolerated left lower extremity.  Formal consult to follow.  Follow up in clinic 6 weeks.

## 2023-04-17 NOTE — PLAN OF CARE
K+ replaced with BPA protocol. Pt slept most of day, awake for meals total feed), and when turned. Pt quiet and calm when at rest, yells when turned. Medicated for pain with PT session. Per ortho, protected WB as tolerated. Specialty bed ordered per [facility] due to patient's high risk for skin breakdown. On RA at baseline. On 2.5-3L 02 NC.   at bedside and spoke with hospice, daughter visited later and also met with hospice. Pursuing rehab.           Problem: Adult Inpatient Plan of Care  Goal: Plan of Care Review  Outcome: Ongoing, Progressing  Goal: Patient-Specific Goal (Individualized)  Outcome: Ongoing, Progressing  Goal: Absence of Hospital-Acquired Illness or Injury  Outcome: Ongoing, Progressing  Intervention: Identify and Manage Fall Risk  Recent Flowsheet Documentation  Taken 4/17/2023 1600 by Kimberlyn Guy RN  Safety Promotion/Fall Prevention:   activity supervised   assistive device/personal items within reach   clutter free environment maintained   toileting scheduled   safety round/check completed   room organization consistent  Taken 4/17/2023 1400 by Kimberlyn Guy RN  Safety Promotion/Fall Prevention:   activity supervised   assistive device/personal items within reach   clutter free environment maintained   toileting scheduled   safety round/check completed   room organization consistent  Taken 4/17/2023 1200 by Kimberlyn Guy RN  Safety Promotion/Fall Prevention:   assistive device/personal items within reach   activity supervised   clutter free environment maintained   toileting scheduled   safety round/check completed   room organization consistent  Taken 4/17/2023 1000 by Kimberlyn Guy RN  Safety Promotion/Fall Prevention:   activity supervised   assistive device/personal items within reach   clutter free environment maintained   toileting scheduled   safety round/check completed   room organization consistent  Taken 4/17/2023 0800 by Kimberlyn Guy RN  Safety Promotion/Fall  Prevention:   activity supervised   assistive device/personal items within reach   clutter free environment maintained   toileting scheduled   safety round/check completed   room organization consistent  Intervention: Prevent Skin Injury  Recent Flowsheet Documentation  Taken 4/17/2023 1600 by Kimberlyn Guy RN  Body Position:   turned   right  Skin Protection:   adhesive use limited   tubing/devices free from skin contact   transparent dressing maintained   skin-to-skin areas padded   skin-to-device areas padded   skin sealant/moisture barrier applied  Taken 4/17/2023 1400 by Kimberlyn Guy RN  Body Position:   left   turned  Skin Protection:   adhesive use limited   tubing/devices free from skin contact   transparent dressing maintained   skin-to-skin areas padded   skin-to-device areas padded  Taken 4/17/2023 1200 by Kimberlyn Guy RN  Body Position:   right   side-lying  Skin Protection:   adhesive use limited   tubing/devices free from skin contact   transparent dressing maintained   skin-to-skin areas padded   skin-to-device areas padded   skin sealant/moisture barrier applied  Taken 4/17/2023 1000 by Kimberlyn Guy RN  Body Position:   supine, legs elevated   weight shifting   tilted  Skin Protection:   adhesive use limited   drying agents applied   tubing/devices free from skin contact   transparent dressing maintained   skin-to-skin areas padded   skin-to-device areas padded  Taken 4/17/2023 0800 by Kimberlyn Guy RN  Body Position: (back, unable to tolerate L) turned  Skin Protection:   adhesive use limited   tubing/devices free from skin contact   transparent dressing maintained   skin-to-skin areas padded   skin-to-device areas padded  Intervention: Prevent and Manage VTE (Venous Thromboembolism) Risk  Recent Flowsheet Documentation  Taken 4/17/2023 1600 by Kimberlyn Guy RN  Activity Management: (dependent for repositioning, transfers) --  Taken 4/17/2023 0800 by Kimberlyn Guy  RN  VTE Prevention/Management: (unable to tolerate, legs pulled up, will not extend) sequential compression devices off  Range of Motion: (limited) --  Intervention: Prevent Infection  Recent Flowsheet Documentation  Taken 4/17/2023 1600 by Kimberlyn Guy RN  Infection Prevention:   personal protective equipment utilized   hand hygiene promoted   equipment surfaces disinfected  Taken 4/17/2023 1400 by Kimberlyn Guy RN  Infection Prevention:   personal protective equipment utilized   hand hygiene promoted  Taken 4/17/2023 1200 by Kimberlyn Guy RN  Infection Prevention:   personal protective equipment utilized   hand hygiene promoted  Taken 4/17/2023 1000 by Kimberlyn Guy RN  Infection Prevention:   personal protective equipment utilized   hand hygiene promoted   equipment surfaces disinfected  Taken 4/17/2023 0800 by Kimberlyn Guy RN  Infection Prevention:   personal protective equipment utilized   hand hygiene promoted  Goal: Optimal Comfort and Wellbeing  Outcome: Ongoing, Progressing  Intervention: Provide Person-Centered Care  Recent Flowsheet Documentation  Taken 4/17/2023 1600 by Kimberlyn Guy RN  Trust Relationship/Rapport: care explained  Taken 4/17/2023 0800 by Kimberlyn Guy RN  Trust Relationship/Rapport: care explained  Goal: Readiness for Transition of Care  Outcome: Ongoing, Progressing     Problem: Fall Injury Risk  Goal: Absence of Fall and Fall-Related Injury  Outcome: Ongoing, Progressing  Intervention: Identify and Manage Contributors  Recent Flowsheet Documentation  Taken 4/17/2023 1600 by Kimberlyn Guy RN  Medication Review/Management: medications reviewed  Taken 4/17/2023 1400 by Kimberlyn Guy RN  Medication Review/Management: medications reviewed  Taken 4/17/2023 1200 by Kimberlyn Guy RN  Medication Review/Management: medications reviewed  Taken 4/17/2023 1000 by Kimberlyn Guy RN  Medication Review/Management: medications reviewed  Taken 4/17/2023 0800  by Kimberlyn Guy RN  Medication Review/Management: medications reviewed  Intervention: Promote Injury-Free Environment  Recent Flowsheet Documentation  Taken 4/17/2023 1600 by Kimberlyn Guy RN  Safety Promotion/Fall Prevention:   activity supervised   assistive device/personal items within reach   clutter free environment maintained   toileting scheduled   safety round/check completed   room organization consistent  Taken 4/17/2023 1400 by Kimberlyn Guy RN  Safety Promotion/Fall Prevention:   activity supervised   assistive device/personal items within reach   clutter free environment maintained   toileting scheduled   safety round/check completed   room organization consistent  Taken 4/17/2023 1200 by Kimberlyn Guy RN  Safety Promotion/Fall Prevention:   assistive device/personal items within reach   activity supervised   clutter free environment maintained   toileting scheduled   safety round/check completed   room organization consistent  Taken 4/17/2023 1000 by Kimberlyn Gyu RN  Safety Promotion/Fall Prevention:   activity supervised   assistive device/personal items within reach   clutter free environment maintained   toileting scheduled   safety round/check completed   room organization consistent  Taken 4/17/2023 0800 by Kimberlyn Guy RN  Safety Promotion/Fall Prevention:   activity supervised   assistive device/personal items within reach   clutter free environment maintained   toileting scheduled   safety round/check completed   room organization consistent     Problem: Skin Injury Risk Increased  Goal: Skin Health and Integrity  Outcome: Ongoing, Progressing  Intervention: Optimize Skin Protection  Recent Flowsheet Documentation  Taken 4/17/2023 1600 by Kimberlyn Guy RN  Pressure Reduction Techniques:   heels elevated off bed   pressure points protected   weight shift assistance provided  Head of Bed (HOB) Positioning: HOB at 20-30 degrees  Pressure Reduction Devices:    pressure-redistributing mattress utilized   positioning supports utilized   heel offloading device utilized  Skin Protection:   adhesive use limited   tubing/devices free from skin contact   transparent dressing maintained   skin-to-skin areas padded   skin-to-device areas padded   skin sealant/moisture barrier applied  Taken 4/17/2023 1400 by Kimberlyn Guy RN  Pressure Reduction Techniques:   heels elevated off bed   weight shift assistance provided   pressure points protected   positioned off wounds  Head of Bed (HOB) Positioning: HOB at 20-30 degrees  Pressure Reduction Devices:   pressure-redistributing mattress utilized   positioning supports utilized   heel offloading device utilized   foam padding utilized  Skin Protection:   adhesive use limited   tubing/devices free from skin contact   transparent dressing maintained   skin-to-skin areas padded   skin-to-device areas padded  Taken 4/17/2023 1200 by Kimberlyn Guy RN  Pressure Reduction Techniques:   weight shift assistance provided   pressure points protected   positioned off wounds   heels elevated off bed  Head of Bed (HOB) Positioning: HOB at 20-30 degrees  Pressure Reduction Devices:   pressure-redistributing mattress utilized   positioning supports utilized   heel offloading device utilized   foam padding utilized  Skin Protection:   adhesive use limited   tubing/devices free from skin contact   transparent dressing maintained   skin-to-skin areas padded   skin-to-device areas padded   skin sealant/moisture barrier applied  Taken 4/17/2023 1000 by Kimberlyn Guy RN  Pressure Reduction Techniques:   positioned off wounds   heels elevated off bed   pressure points protected  Head of Bed (HOB) Positioning: HOB at 20-30 degrees  Pressure Reduction Devices:   pressure-redistributing mattress utilized   positioning supports utilized   heel offloading device utilized   foam padding utilized  Skin Protection:   adhesive use limited   drying agents  applied   tubing/devices free from skin contact   transparent dressing maintained   skin-to-skin areas padded   skin-to-device areas padded  Taken 4/17/2023 0800 by Kimberlyn Guy RN  Pressure Reduction Techniques:   positioned off wounds   heels elevated off bed   weight shift assistance provided  Head of Bed (HOB) Positioning: HOB at 20-30 degrees  Pressure Reduction Devices:   pressure-redistributing mattress utilized   positioning supports utilized   heel offloading device utilized  Skin Protection:   adhesive use limited   tubing/devices free from skin contact   transparent dressing maintained   skin-to-skin areas padded   skin-to-device areas padded   Goal Outcome Evaluation:

## 2023-04-18 LAB — BACTERIA SPEC AEROBE CULT: ABNORMAL

## 2023-04-18 PROCEDURE — 97535 SELF CARE MNGMENT TRAINING: CPT

## 2023-04-18 PROCEDURE — 99232 SBSQ HOSP IP/OBS MODERATE 35: CPT | Performed by: INTERNAL MEDICINE

## 2023-04-18 PROCEDURE — 25010000002 CEFTRIAXONE PER 250 MG

## 2023-04-18 PROCEDURE — G0378 HOSPITAL OBSERVATION PER HR: HCPCS

## 2023-04-18 PROCEDURE — 97530 THERAPEUTIC ACTIVITIES: CPT | Performed by: PHYSICAL THERAPIST

## 2023-04-18 PROCEDURE — 97166 OT EVAL MOD COMPLEX 45 MIN: CPT

## 2023-04-18 RX ADMIN — Medication 1 TABLET: at 10:02

## 2023-04-18 RX ADMIN — Medication 250 MG: at 10:06

## 2023-04-18 RX ADMIN — Medication 10 ML: at 10:11

## 2023-04-18 RX ADMIN — CITALOPRAM HYDROBROMIDE 20 MG: 20 TABLET ORAL at 10:02

## 2023-04-18 RX ADMIN — ACETAMINOPHEN 325MG 650 MG: 325 TABLET ORAL at 10:07

## 2023-04-18 RX ADMIN — DONEPEZIL HYDROCHLORIDE 10 MG: 10 TABLET ORAL at 10:02

## 2023-04-18 RX ADMIN — MEMANTINE 10 MG: 10 TABLET ORAL at 10:02

## 2023-04-18 RX ADMIN — CEFTRIAXONE 1 G: 1 INJECTION, POWDER, FOR SOLUTION INTRAMUSCULAR; INTRAVENOUS at 20:33

## 2023-04-18 RX ADMIN — Medication 50 MCG: at 10:03

## 2023-04-18 RX ADMIN — DONEPEZIL HYDROCHLORIDE 10 MG: 10 TABLET ORAL at 20:32

## 2023-04-18 RX ADMIN — Medication 250 MG: at 20:32

## 2023-04-18 NOTE — PROGRESS NOTES
[facility] Medicine Services  PROGRESS NOTE    Patient Name: Theresa Barrow  : 1950  MRN: [id number]    Date of Admission: 2023  Primary Care Physician: Mukul Fontaine MD    Subjective   Subjective     CC:  Debility, fall    HPI:  Patient resting in bed. She is pleasantly demented and not really able to answer many questions for me.     ROS:  UTO due to dementia    Objective   Objective     Vital Signs:   Temp:  [98.2 °F (36.8 °C)-99.2 °F (37.3 °C)] 99 °F (37.2 °C)  Heart Rate:  [73-78] 73  Resp:  [18-20] 19  BP: (120-188)/(59-79) 161/78  Flow (L/min):  [1-2.5] 1     Physical Exam:  Constitutional: No acute distress, awake, alert  HENT: NCAT, mucous membranes moist  Respiratory: Clear to auscultation bilaterally, respiratory effort normal   Cardiovascular: RRR, no murmurs, rubs, or gallops  Gastrointestinal:soft, nontender, nondistended  Musculoskeletal: No bilateral ankle edema  Neurologic: severe dementia, able to answer simple yes/no questions, moves all ext.  Skin: No rashes      Results Reviewed:  LAB RESULTS:      Lab 23  0510 23  1639   WBC 11.59* 14.83*   HEMOGLOBIN 9.7* 10.3*   HEMATOCRIT 30.6* 31.6*   PLATELETS 299 330   NEUTROS ABS 8.25* 12.48*   IMMATURE GRANS (ABS) 0.09* 0.08*   LYMPHS ABS 1.62 0.92   MONOS ABS 1.23* 1.17*   EOS ABS 0.32 0.09   MCV 98.7* 96.0   PROTIME  --  14.2   APTT  --  28.1*         Lab 23  1639 23  0510 23  1639   SODIUM  --  142 141   POTASSIUM 4.2 3.4* 3.4*   CHLORIDE  --  104 103   CO2  --  25.0 26.0   ANION GAP  --  13.0 12.0   BUN  --  23 18   CREATININE  --  1.34* 1.07*   EGFR  --  41.4* 54.3*   GLUCOSE  --  112* 140*   CALCIUM  --  8.5* 8.6   MAGNESIUM  --  2.0  --          Lab 23  1639   TOTAL PROTEIN 6.3   ALBUMIN 3.4*   GLOBULIN 2.9   ALT (SGPT) 15   AST (SGOT) 26   BILIRUBIN 0.6   ALK PHOS 114         Lab 23  1639   PROTIME 14.2   INR 1.10             Lab 23  6197  04/16/23  1711   ABO TYPING A A   RH TYPING Positive Positive   ANTIBODY SCREEN  --  Negative         Brief Urine Lab Results  (Last result in the past 365 days)      Color   Clarity   Blood   Leuk Est   Nitrite   Protein   CREAT   Urine HCG        04/16/23 1709 Yellow   Turbid   Moderate (2+)   Large (3+)   Positive   100 mg/dL (2+)                 Microbiology Results Abnormal     None          CT Pelvis Without Contrast    Result Date: 4/16/2023  CT PELVIS WO CONTRAST Date of Exam: 4/16/2023 5:36 PM EDT Indication: fall from bed with significant left hip/pelvis pain. Comparison: Radiograph 4/16/2023 Technique: Axial CT images were obtained of the pelvis without contrast administration.  Reconstructed coronal and sagittal images were also obtained. Automated exposure control and iterative construction methods were used. Findings: There is some minimal buckling of the superior pubic ramus on the left consistent with a nondisplaced fracture. This is not seen even in retrospect on prior radiograph. There is also buckling of the left sacral ala consistent with a nondisplaced fracture. The sacroiliac joints appear intact. There is mild narrowing of the hip joint spaces bilaterally. Left proximal femur appears intact. Visualized portions the right femur also appear intact. Soft tissues of the pelvis appear within normal limits. No free intraperitoneal fluid. No pelvic adenopathy. There are multiple colonic diverticula.     Impression: Impression: 1. Acute nondisplaced fractures involving the left superior pubic ramus near the midline. There is also an acute nondisplaced fracture of the left sacral ala. Sacral iliac joint is intact. Electronically Signed: Vick Enamorado  4/16/2023 6:09 PM EDT  Workstation ID: [id number]    XR Hip With or Without Pelvis 2 - 3 View Left    Result Date: 4/16/2023  XR HIP W OR WO PELVIS 2-3 VIEW LEFT Date of Exam: 4/16/2023 4:07 PM EDT Indication: fall from bed. Comparison: 3/9/2023 Findings:  There is diffuse osteopenia. There is no acute fracture or dislocation. Hip joint spaces appear within normal limits and symmetrical bilaterally. Soft tissues are unremarkable.     Impression: Impression: 1. Osteopenia. No acute bony abnormality of the pelvis or left hip. Electronically Signed: Vick Enamorado  4/16/2023 4:30 PM EDT  Workstation ID: [id number]          Current medications:  Scheduled Meds:Calcium Carb-Cholecalciferol, 1 tablet, Oral, Daily  cefTRIAXone, 1 g, Intravenous, Q24H  citalopram, 20 mg, Oral, Daily  divalproex, 125 mg, Oral, Daily  donepezil, 10 mg, Oral, BID  memantine, 10 mg, Oral, Daily  saccharomyces boulardii, 250 mg, Oral, BID  sodium chloride, 10 mL, Intravenous, Q12H  vitamin B-12, 50 mcg, Oral, Daily  vitamin D3, 5,000 Units, Oral, Daily      Continuous Infusions:   PRN Meds:.•  acetaminophen **OR** acetaminophen **OR** acetaminophen  •  HYDROmorphone  •  ondansetron **OR** ondansetron  •  Potassium Replacement - Follow Nurse / BPA Driven Protocol  •  [COMPLETED] Insert Peripheral IV **AND** sodium chloride  •  sodium chloride  •  sodium chloride    Assessment & Plan   Assessment & Plan     Active Hospital Problems    Diagnosis  POA   • **Accidental fall from bed, initial encounter [W06.XXXA]  Yes   • Dementia [F03.90]  Unknown   • CKD (chronic kidney disease) stage 3, GFR 30-59 ml/min [N18.30]  Unknown   • HLD (hyperlipidemia) [E78.5]  Unknown   • OA (osteoarthritis) [M19.90]  Unknown   • UTI (urinary tract infection) [N39.0]  Unknown   • Pubic ramus fracture [S32.599A]  Unknown   • Sacral fracture [S32.10XA]  Unknown   • Hypokalemia [E87.6]  Unknown      Resolved Hospital Problems   No resolved problems to display.        Brief Hospital Course to date:  Theresa Barrow is a 73 y.o. female PMH of CKD3, HLD, OA and dementia with advancing debility presents to the ED after a fall.      Fall  Pubic Ramus Fracture  Sacral Fracture  -CT pelvis shows acute nondisplaced fractures involving the  left superior pubic ramus and acute nondisplaced fracture of the left sacral ala  -Pain control  -Conservative management     Proteus UTI  -Rocephin      Anemia  -at baseline    Hypokalemia  -Replace per protocol     CKD3  - at baseline  -Avoid nephrotoxic agents  -Strict I/Os     Dementia  -Fall precautions    GOC  -  unsafe to care for patient, will need placement vs. Home with hospice care. Hospice following    Expected Discharge Location and Transportation:  Home with hospice vs LTC   Expected Discharge   Expected Discharge Date and Time     Expected Discharge Date Expected Discharge Time    Apr 21, 2023            DVT prophylaxis:  Mechanical DVT prophylaxis orders are present.     AM-PAC 6 Clicks Score (PT): 8 (04/18/23 1019)    CODE STATUS:   Code Status and Medical Interventions:   Ordered at: 04/16/23 2003     Medical Intervention Limits:    NO intubation (DNI)     Code Status (Patient has no pulse and is not breathing):    No CPR (Do Not Attempt to Resuscitate)     Medical Interventions (Patient has pulse or is breathing):    Limited Support       Martha Trinidad,   04/18/23

## 2023-04-18 NOTE — PLAN OF CARE
Goal Outcome Evaluation:  Plan of Care Reviewed With: patient           Outcome Evaluation: Pt presents with advanced dementia, pain, decreased activity tolerance and decreased balance limiting independence with self care.  Pt max/dep self feeding, dep LBD, min A to wash face, dep bed mobility,  max  x 2 first STS, and dep x 2 2nd STS using RW. OT will follow 3 x/wk to promote increased ind with self care/ mobility.  Recommend SNF if pt is able to increase level of participation, but if unable to increase participation, pt will require 24/7 care or ECF.

## 2023-04-18 NOTE — THERAPY TREATMENT NOTE
Patient Name: Theresa Barrow  : 1950    MRN: [id number]                              Today's Date: 2023       Admit Date: 2023    Visit Dx:     ICD-10-CM ICD-9-CM   1. Accidental fall from bed, initial encounter  W06.XXXA E884.4   2. Multiple closed fractures of pelvis without disruption of pelvic ring, initial encounter  S32.82XA 808.44   3. Severe Alzheimer's dementia, unspecified timing of dementia onset, unspecified whether behavioral, psychotic, or mood disturbance or anxiety  G30.9 331.0    F02.C0 294.10   4. Bacteriuria with pyuria  R82.71 791.9    R82.81      Patient Active Problem List   Diagnosis   • Accidental fall from bed, initial encounter   • Dementia   • CKD (chronic kidney disease) stage 3, GFR 30-59 ml/min   • HLD (hyperlipidemia)   • OA (osteoarthritis)   • UTI (urinary tract infection)   • Pubic ramus fracture   • Sacral fracture   • Hypokalemia     Past Medical History:   Diagnosis Date   • Arthritis    • Chronic kidney disease    • Depression    • Urinary tract infection      History reviewed. No pertinent surgical history.   General Information     Row Name 23 1004          Physical Therapy Time and Intention    Document Type therapy note (daily note)  -     Mode of Treatment individual therapy;physical therapy  -     Row Name 23 1004          General Information    Patient Profile Reviewed yes  -LM     Existing Precautions/Restrictions fall;other (see comments)  L Superior Pubic Rami Fx; L Sacral Ala Fx; Protected WBAT LLE;  Advanced Dementia; Brief with OOB activity  -     Row Name 23 1004          Cognition    Orientation Status (Cognition) disoriented to;person;place;situation;time  Pt will look at you when you say her name  -     Row Name 23 1004          Safety Issues, Functional Mobility    Safety Issues Affecting Function (Mobility) ability to follow commands;awareness of need for assistance;friction/shear risk;insight into  deficits/self-awareness;judgment;positioning of assistive device;problem-solving;safety precaution awareness;safety precautions follow-through/compliance;sequencing abilities  -LM     Impairments Affecting Function (Mobility) balance;cognition;endurance/activity tolerance;pain;postural/trunk control;range of motion (ROM);strength  -LM           User Key  (r) = Recorded By, (t) = Taken By, (c) = Cosigned By    Initials Name Provider Type    LM Taty Frias, PT Physical Therapist               Mobility     Row Name 04/18/23 1006          Bed Mobility    Bed Mobility rolling left;rolling right;supine-sit;sit-supine  -LM     Rolling Left [place] (Bed Mobility) dependent (less than 25% patient effort);1 person assist;verbal cues;nonverbal cues (demo/gesture)  -LM     Rolling Right [place] (Bed Mobility) dependent (less than 25% patient effort);1 person assist;verbal cues;nonverbal cues (demo/gesture)  -LM     Supine-Sit [place] (Bed Mobility) dependent (less than 25% patient effort);2 person assist;verbal cues;nonverbal cues (demo/gesture)  -LM     Sit-Supine [place] (Bed Mobility) dependent (less than 25% patient effort);2 person assist;verbal cues;nonverbal cues (demo/gesture)  -LM     Assistive Device (Bed Mobility) bed rails;head of bed elevated  -LM     Comment, (Bed Mobility) Vc's for sequencing.  Pt screams extremely loudly with all transitional movements, the worst being with rolling.  Pt dependent x 2 d/t not initiating any movement.  -LM     Row Name 04/18/23 1006          Sit-Stand Transfer    Sit-Stand [place] (Transfers) maximum assist (25% patient effort);dependent (less than 25% patient effort);2 person assist;verbal cues  -LM     Assistive Device (Sit-Stand Transfers) walker, front-wheeled  -LM     Comment, (Sit-Stand Transfer) Stood x 2 reps from EOB - first from EOB with MaxAx2; second from EOB dependently x 2.  Vc's for sequencing.  Posterior lean noted.  -LM     Row Name  04/18/23 1006          Gait/Stairs (Locomotion)    [place] Level (Gait) not tested  -LM     Comment, (Gait/Stairs) Not safe to attempt on this date.  -LM           User Key  (r) = Recorded By, (t) = Taken By, (c) = Cosigned By    Initials Name Provider Type    Taty Mills PT Physical Therapist               Obj/Interventions     Row Name 04/18/23 1012          Motor Skills    Therapeutic Exercise --  Pt screams with attempted passive movement and resist all movemet.  Pt does not follow commands to complete active exercises.  -LM     Row Name 04/18/23 1012          Balance    Static Sitting Balance minimal assist  -LM     Position, Sitting Balance unsupported;sitting edge of bed  -LM     Static Standing Balance maximum assist;2-person assist;verbal cues  -LM     Position/Device Used, Standing Balance supported;walker, front-wheeled  -LM     Comment, Balance Pt would range from Yasir d/t posterior lean to SBA with static sitting balance.  -LM           User Key  (r) = Recorded By, (t) = Taken By, (c) = Cosigned By    Initials Name Provider Type    Taty Mills PT Physical Therapist               Goals/Plan    No documentation.                Clinical Impression     Row Name 04/18/23 1013          Pain    Pain Intervention(s) Repositioned;Ambulation/increased activity  -     Additional Documentation Pain Scale: FACES Pre/Post-Treatment (Group)  -[facility] Name 04/18/23 1013          Pain Scale: FACES Pre/Post-Treatment    Pain: FACES Scale, Pretreatment 8-->hurts whole lot  -LM     Posttreatment Pain Rating 8-->hurts whole lot  -LM     Pain Location - Side/Orientation Left  -LM     Pain Location - hip  -LM     Pre/Posttreatment Pain Comment 0/10 with rest, but 8/10 with movement - pt screams and grimaces  -LM     Row Name 04/18/23 1013          Plan of Care Review    Plan of Care Reviewed With patient  -LM     Progress no change  -LM     Outcome Evaluation Pt significantly limited by advanced  dementia.  Pt required dependent x 2 assist with all bed mobility d/t pt not initiating any movement.  Pt screams extremely loudly and resist movement.  No active commands followed.  Pt did sit EOB ranging from SBA-Yasir d/t posterior lean.  Pt stood x 2 reps from EOB - first with MaxAx2; second dependently x 2 (again, d/t pt resisting).  Will decrease pt frequency to 3x/wk.  Will continue to recommend SNF at this time, but if no participation or functional progress noted, pt will be more appropriate for 24/7 care or LTC facility.  Will continue to follow.  -LM     Row Name 04/18/23 1013          Therapy Assessment/Plan (PT)    Rehab Potential (PT) fair, will monitor progress closely  -LM     Therapy Frequency (PT) 3 times/wk  -LM     Row Name 04/18/23 1013          Vital Signs    Post Systolic BP Rehab 161  -LM     Post Treatment Diastolic BP 78  -LM     Pretreatment Heart Rate (beats/min) 78  -LM     Posttreatment Heart Rate (beats/min) 72  -LM     Pre SpO2 (%) 100  -LM     O2 Delivery Pre Treatment supplemental O2  -LM     Intra SpO2 (%) 90  -LM     O2 Delivery Intra Treatment room air  -LM     Post SpO2 (%) 94  -LM     O2 Delivery Post Treatment supplemental O2  -LM     Pre Patient Position Supine  -LM     Post Patient Position Supine  -LM     Row Name 04/18/23 1013          Positioning and Restraints    Pre-Treatment Position in bed  -LM     Post Treatment Position bed  -LM     In Bed fowlers;call light within reach;encouraged to call for assist;exit alarm on;notified nsg  -LM           User Key  (r) = Recorded By, (t) = Taken By, (c) = Cosigned By    Initials Name Provider Type    LM Taty Frias, PT Physical Therapist               Outcome Measures     Row Name 04/18/23 1019 04/18/23 0915       How much help from another person do you currently need...    Turning from your back to your side while in flat bed without using bedrails? 1  -LM 1  -CB    Moving from lying on back to sitting on the side of a flat  bed without bedrails? 1  -LM 1  -CB    Moving to and from a bed to a chair (including a wheelchair)? 2  -LM 2  -CB    Standing up from a chair using your arms (e.g., wheelchair, bedside chair)? 2  -LM 2  -CB    Climbing 3-5 steps with a railing? 1  -LM 1  -CB    To walk in hospital room? 1  -LM 1  -CB    AM-PAC 6 Clicks Score (PT) 8  -LM 8  -CB    Highest level of mobility 3 --> Sat at edge of bed  -LM 3 --> Sat at edge of bed  -CB    Row Name 04/18/23 1027 04/18/23 1019       Functional Assessment    Outcome Measure Options AM-PAC 6 Clicks Daily Activity (OT)  -AC AM-PAC 6 Clicks Basic Mobility (PT)  -LM          User Key  (r) = Recorded By, (t) = Taken By, (c) = Cosigned By    Initials Name Provider Type    AC Amber Clemens, OT Occupational Therapist    LM Taty Frias, PT Physical Therapist    CB Carrie Smiley, RN Registered Nurse                             Physical Therapy Education     Title: PT OT SLP Therapies (In Progress)     Topic: Physical Therapy (Done)     Point: Mobility training (Done)     Learning Progress Summary           Patient Acceptance, E, VU,NR by LO at 4/17/2023 1041    Comment: PT POC, CG use of gait belt for home   Family Acceptance, E, VU,NR by LO at 4/17/2023 1041    Comment: PT POC, CG use of gait belt for home                   Point: Home exercise program (Done)     Learning Progress Summary           Patient Acceptance, E, VU,NR by LO at 4/17/2023 1041    Comment: PT POC, CG use of gait belt for home   Family Acceptance, E, VU,NR by LO at 4/17/2023 1041    Comment: PT POC, CG use of gait belt for home                   Point: Body mechanics (Done)     Learning Progress Summary           Patient Acceptance, E, VU,NR by LO at 4/17/2023 1041    Comment: PT POC, CG use of gait belt for home   Family Acceptance, E, VU,NR by LO at 4/17/2023 1041    Comment: PT POC, CG use of gait belt for home                   Point: Precautions (Done)     Learning Progress Summary            Patient Acceptance, E, VU,NR by KRISTEN at 4/17/2023 1041    Comment: PT POC, CG use of gait belt for home   Family Acceptance, E, VU,NR by KRISTEN at 4/17/2023 1041    Comment: PT POC, CG use of gait belt for home                               User Key     Initials Effective Dates Name Provider Type Discipline    KRISTEN 06/16/21 -  Mary Jane Avila, PT Physical Therapist PT              PT Recommendation and Plan     Plan of Care Reviewed With: patient  Progress: no change  Outcome Evaluation: Pt significantly limited by advanced dementia.  Pt required dependent x 2 assist with all bed mobility d/t pt not initiating any movement.  Pt screams extremely loudly and resist movement.  No active commands followed.  Pt did sit EOB ranging from SBA-Yasir d/t posterior lean.  Pt stood x 2 reps from EOB - first with MaxAx2; second dependently x 2 (again, d/t pt resisting).  Will decrease pt frequency to 3x/wk.  Will continue to recommend SNF at this time, but if no participation or functional progress noted, pt will be more appropriate for 24/7 care or LTC facility.  Will continue to follow.     Time Calculation:    PT Charges     Row Name 04/18/23 1019             Time Calculation    Start Time 0915  -LM      PT Received On 04/18/23  -LM      PT Goal Re-Cert Due Date 04/27/23  -LM         Timed Charges    39803 - PT Therapeutic Activity Minutes 60  -LM         Total Minutes    Timed Charges Total Minutes 60  -LM       Total Minutes 60  -LM            User Key  (r) = Recorded By, (t) = Taken By, (c) = Cosigned By    Initials Name Provider Type     Ttay Frias, SVETLANA Physical Therapist              Therapy Charges for Today     Code Description Service Date Service Provider Modifiers Qty    [id number]  PT THERAPEUTIC ACT EA 15 MIN 4/18/2023 Taty Frias, PT GP 4          PT G-Codes  Outcome Measure Options: AM-PAC 6 Clicks Daily Activity (OT)  AM-PAC 6 Clicks Score (PT): 8  AM-PAC 6 Clicks Score (OT): 8  PT Discharge Summary  Anticipated  Discharge Disposition (PT): skilled nursing facility (SNF vs. 24/7 care or LTC facility if progress or participation does not improve)    Taty Quarles, PT  4/18/2023

## 2023-04-18 NOTE — PLAN OF CARE
Goal Outcome Evaluation:  Plan of Care Reviewed With: patient        Progress: no change  Outcome Evaluation: Pt significantly limited by advanced dementia.  Pt required dependent x 2 assist with all bed mobility d/t pt not initiating any movement.  Pt screams extremely loudly and resist movement.  No active commands followed.  Pt did sit EOB ranging from SBA-Yasir d/t posterior lean.  Pt stood x 2 reps from EOB - first with MaxAx2; second dependently x 2 (again, d/t pt resisting).  Will decrease pt frequency to 3x/wk.  Will continue to recommend SNF at this time, but if no participation or functional progress noted, pt will be more appropriate for 24/7 care or LTC facility.  Will continue to follow.

## 2023-04-18 NOTE — DISCHARGE PLACEMENT REQUEST
"Case Management [phone]    Theresa Larson (73 y.o. Female)     Date of Birth   1950    Social Security Number       Address   [address] [address]    Home Phone   [phone]    MRN   [id number]       Roman [employer]   None    Marital Status                               Admission Date   23    Admission Type   Emergency    Admitting Provider   Martha Trinidad DO    Attending Provider   Martha Trinidad DO    Department, Room/Bed   [address], S573/1       Discharge Date       Discharge Disposition       Discharge Destination                               Attending Provider: Martha Trinidad DO    Allergies: No Known Allergies    Isolation: None   Infection: None   Code Status: No CPR    Ht: 162.6 cm (64\")   Wt: 60.3 kg (133 lb)    Admission Cmt: None   Principal Problem: Accidental fall from bed, initial encounter [W06.XXXA]                 Active Insurance as of 2023     Primary Coverage     Payor Plan Insurance Group Employer/Plan Group    HUMANA MEDICARE REPLACEMENT HUMANA MEDICARE REPLACEMENT [id number]     Payor Plan Address Payor Plan Phone Number Payor Plan Fax Number Effective Dates    [address] [address] [phone]  2023 - None Entered    [facility] [address]       Subscriber Name Subscriber Birth Date Member ID       THERESA LARSON 1950 [id number]                 Emergency Contacts      (Rel.) Home Phone Work Phone Mobile Phone    NAHUN LARSON (Spouse) [phone] -- [phone]    RODRADHIKA REGAN (Daughter) [phone] -- [phone]               Physical Therapy Notes (most recent note)      Taty Frias, PT at 23 0915  Version 3 of 3         Patient Name: Theresa Larson  : 1950    MRN: [id number]                              Today's Date: 2023       Admit Date: 2023    Visit Dx:     ICD-10-CM ICD-9-CM   1. Accidental fall from bed, initial encounter  W06.XXXA E884.4   2. Multiple closed " fractures of pelvis without disruption of pelvic ring, initial encounter  S32.82XA 808.44   3. Severe Alzheimer's dementia, unspecified timing of dementia onset, unspecified whether behavioral, psychotic, or mood disturbance or anxiety  G30.9 331.0    F02.C0 294.10   4. Bacteriuria with pyuria  R82.71 791.9    R82.81      Patient Active Problem List   Diagnosis   • Accidental fall from bed, initial encounter   • Dementia   • CKD (chronic kidney disease) stage 3, GFR 30-59 ml/min   • HLD (hyperlipidemia)   • OA (osteoarthritis)   • UTI (urinary tract infection)   • Pubic ramus fracture   • Sacral fracture   • Hypokalemia     Past Medical History:   Diagnosis Date   • Arthritis    • Chronic kidney disease    • Depression    • Urinary tract infection      History reviewed. No pertinent surgical history.   General Information     Row Name 04/18/23 1004          Physical Therapy Time and Intention    Document Type therapy note (daily note)  -     Mode of Treatment individual therapy;physical therapy  -     Row Name 04/18/23 1004          General Information    Patient Profile Reviewed yes  -     Existing Precautions/Restrictions fall;other (see comments)  L Superior Pubic Rami Fx; L Sacral Ala Fx; Protected WBAT LLE;  Advanced Dementia; Brief with OOB activity  -     Row Name 04/18/23 1004          Cognition    Orientation Status (Cognition) disoriented to;person;place;situation;time  Pt will look at you when you say her name  -LM     Row Name 04/18/23 1004          Safety Issues, Functional Mobility    Safety Issues Affecting Function (Mobility) ability to follow commands;awareness of need for assistance;friction/shear risk;insight into deficits/self-awareness;judgment;positioning of assistive device;problem-solving;safety precaution awareness;safety precautions follow-through/compliance;sequencing abilities  -     Impairments Affecting Function (Mobility) balance;cognition;endurance/activity  tolerance;pain;postural/trunk control;range of motion (ROM);strength  -LM           User Key  (r) = Recorded By, (t) = Taken By, (c) = Cosigned By    Initials Name Provider Type    LM Taty Frias, PT Physical Therapist               Mobility     Row Name 04/18/23 1006          Bed Mobility    Bed Mobility rolling left;rolling right;supine-sit;sit-supine  -LM     Rolling Left [place] (Bed Mobility) dependent (less than 25% patient effort);1 person assist;verbal cues;nonverbal cues (demo/gesture)  -LM     Rolling Right [place] (Bed Mobility) dependent (less than 25% patient effort);1 person assist;verbal cues;nonverbal cues (demo/gesture)  -LM     Supine-Sit [place] (Bed Mobility) dependent (less than 25% patient effort);2 person assist;verbal cues;nonverbal cues (demo/gesture)  -LM     Sit-Supine [place] (Bed Mobility) dependent (less than 25% patient effort);2 person assist;verbal cues;nonverbal cues (demo/gesture)  -LM     Assistive Device (Bed Mobility) bed rails;head of bed elevated  -LM     Comment, (Bed Mobility) Vc's for sequencing.  Pt screams extremely loudly with all transitional movements, the worst being with rolling.  Pt dependent x 2 d/t not initiating any movement.  -LM     Row Name 04/18/23 1006          Sit-Stand Transfer    Sit-Stand [place] (Transfers) maximum assist (25% patient effort);dependent (less than 25% patient effort);2 person assist;verbal cues  -LM     Assistive Device (Sit-Stand Transfers) walker, front-wheeled  -LM     Comment, (Sit-Stand Transfer) Stood x 2 reps from EOB - first from EOB with MaxAx2; second from EOB dependently x 2.  Vc's for sequencing.  Posterior lean noted.  -LM     Row Name 04/18/23 1006          Gait/Stairs (Locomotion)    [place] Level (Gait) not tested  -LM     Comment, (Gait/Stairs) Not safe to attempt on this date.  -LM           User Key  (r) = Recorded By, (t) = Taken By, (c) = Cosigned By    Initials Name Provider Type     LM Taty Frias PT Physical Therapist               Obj/Interventions     Row Name 04/18/23 1012          Motor Skills    Therapeutic Exercise --  Pt screams with attempted passive movement and resist all movemet.  Pt does not follow commands to complete active exercises.  -     Row Name 04/18/23 1012          Balance    Static Sitting Balance minimal assist  -LM     Position, Sitting Balance unsupported;sitting edge of bed  -LM     Static Standing Balance maximum assist;2-person assist;verbal cues  -LM     Position/Device Used, Standing Balance supported;walker, front-wheeled  -     Comment, Balance Pt would range from Yasir d/t posterior lean to SBA with static sitting balance.  -LM           User Key  (r) = Recorded By, (t) = Taken By, (c) = Cosigned By    Initials Name Provider Type    LM Taty Frias PT Physical Therapist               Goals/Plan    No documentation.                Clinical Impression     [facility] Name 04/18/23 1013          Pain    Pain Intervention(s) Repositioned;Ambulation/increased activity  -     Additional Documentation Pain Scale: FACES Pre/Post-Treatment (Group)  -LM     Row Name 04/18/23 1013          Pain Scale: FACES Pre/Post-Treatment    Pain: FACES Scale, Pretreatment 8-->hurts whole lot  -LM     Posttreatment Pain Rating 8-->hurts whole lot  -LM     Pain Location - Side/Orientation Left  -LM     Pain Location - hip  -LM     Pre/Posttreatment Pain Comment 0/10 with rest, but 8/10 with movement - pt screams and grimaces  -     Row Name 04/18/23 1013          Plan of Care Review    Plan of Care Reviewed With patient  -     Progress no change  -     Outcome Evaluation Pt significantly limited by advanced dementia.  Pt required dependent x 2 assist with all bed mobility d/t pt not initiating any movement.  Pt screams extremely loudly and resist movement.  No active commands followed.  Pt did sit EOB ranging from SBA-Yasir d/t posterior lean.  Pt stood x 2 reps from EOB -  first with MaxAx2; second dependently x 2 (again, d/t pt resisting).  Will decrease pt frequency to 3x/wk.  Will continue to recommend SNF at this time, but if no participation or functional progress noted, pt will be more appropriate for 24/7 care or LTC facility.  Will continue to follow.  -LM     Row Name 04/18/23 1013          Therapy Assessment/Plan (PT)    Rehab Potential (PT) fair, will monitor progress closely  -LM     Therapy Frequency (PT) 3 times/wk  -LM     Row Name 04/18/23 1013          Vital Signs    Post Systolic BP Rehab 161  -LM     Post Treatment Diastolic BP 78  -LM     Pretreatment Heart Rate (beats/min) 78  -LM     Posttreatment Heart Rate (beats/min) 72  -LM     Pre SpO2 (%) 100  -LM     O2 Delivery Pre Treatment supplemental O2  -LM     Intra SpO2 (%) 90  -LM     O2 Delivery Intra Treatment room air  -LM     Post SpO2 (%) 94  -LM     O2 Delivery Post Treatment supplemental O2  -LM     Pre Patient Position Supine  -LM     Post Patient Position Supine  -LM     Row Name 04/18/23 1013          Positioning and Restraints    Pre-Treatment Position in bed  -LM     Post Treatment Position bed  -LM     In Bed fowlers;call light within reach;encouraged to call for assist;exit alarm on;notified nsg  -LM           User Key  (r) = Recorded By, (t) = Taken By, (c) = Cosigned By    Initials Name Provider Type    Taty Mills, PT Physical Therapist               Outcome Measures     Row Name 04/18/23 1019 04/18/23 0915       How much help from another person do you currently need...    Turning from your back to your side while in flat bed without using bedrails? 1  -LM 1  -CB    Moving from lying on back to sitting on the side of a flat bed without bedrails? 1  -LM 1  -CB    Moving to and from a bed to a chair (including a wheelchair)? 2  -LM 2  -CB    Standing up from a chair using your arms (e.g., wheelchair, bedside chair)? 2  -LM 2  -CB    Climbing 3-5 steps with a railing? 1  -LM 1  -CB    To walk  in hospital room? 1  -LM 1  -CB    AM-PAC 6 Clicks Score (PT) 8  -LM 8  -CB    Highest level of mobility 3 --> Sat at edge of bed  -LM 3 --> Sat at edge of bed  -CB    Row Name 04/18/23 1027 04/18/23 1019       Functional Assessment    Outcome Measure Options AM-PAC 6 Clicks Daily Activity (OT)  -AC AM-PAC 6 Clicks Basic Mobility (PT)  -LM          User Key  (r) = Recorded By, (t) = Taken By, (c) = Cosigned By    Initials Name Provider Type    AC Amber Clemens, OT Occupational Therapist    LM Taty Frias, PT Physical Therapist    CB Carrie Smiley, RN Registered Nurse                             Physical Therapy Education     Title: PT OT SLP Therapies (In Progress)     Topic: Physical Therapy (Done)     Point: Mobility training (Done)     Learning Progress Summary           Patient Acceptance, E, VU,NR by  at 4/17/2023 1041    Comment: PT POC, CG use of gait belt for home   Family Acceptance, E, VU,NR by  at 4/17/2023 1041    Comment: PT POC, CG use of gait belt for home                   Point: Home exercise program (Done)     Learning Progress Summary           Patient Acceptance, E, VU,NR by LO at 4/17/2023 1041    Comment: PT POC, CG use of gait belt for home   Family Acceptance, E, VU,NR by LO at 4/17/2023 1041    Comment: PT POC, CG use of gait belt for home                   Point: Body mechanics (Done)     Learning Progress Summary           Patient Acceptance, E, VU,NR by LO at 4/17/2023 1041    Comment: PT POC, CG use of gait belt for home   Family Acceptance, E, VU,NR by LO at 4/17/2023 1041    Comment: PT POC, CG use of gait belt for home                   Point: Precautions (Done)     Learning Progress Summary           Patient Acceptance, E, VU,NR by LO at 4/17/2023 1041    Comment: PT POC, CG use of gait belt for home   Family Acceptance, E, VU,NR by  at 4/17/2023 1041    Comment: PT POC, CG use of gait belt for home                               User Key     Initials Effective Dates  Name Provider Type Discipline     06/16/21 -  Mary Jane Avila, PT Physical Therapist PT              PT Recommendation and Plan     Plan of Care Reviewed With: patient  Progress: no change  Outcome Evaluation: Pt significantly limited by advanced dementia.  Pt required dependent x 2 assist with all bed mobility d/t pt not initiating any movement.  Pt screams extremely loudly and resist movement.  No active commands followed.  Pt did sit EOB ranging from SBA-Yasir d/t posterior lean.  Pt stood x 2 reps from EOB - first with MaxAx2; second dependently x 2 (again, d/t pt resisting).  Will decrease pt frequency to 3x/wk.  Will continue to recommend SNF at this time, but if no participation or functional progress noted, pt will be more appropriate for 24/7 care or LTC facility.  Will continue to follow.     Time Calculation:    PT Charges     Row Name 04/18/23 1019             Time Calculation    Start Time 0915  -LM      PT Received On 04/18/23  -LM      PT Goal Re-Cert Due Date 04/27/23  -LM         Timed Charges    60677 - PT Therapeutic Activity Minutes 60  -LM         Total Minutes    Timed Charges Total Minutes 60  -LM       Total Minutes 60  -LM            User Key  (r) = Recorded By, (t) = Taken By, (c) = Cosigned By    Initials Name Provider Type     Taty Frias, SVETLANA Physical Therapist              Therapy Charges for Today     Code Description Service Date Service Provider Modifiers Qty    [id number]  PT THERAPEUTIC ACT EA 15 MIN 4/18/2023 Taty Frias, PT GP 4          PT G-Codes  Outcome Measure Options: AM-PAC 6 Clicks Daily Activity (OT)  AM-PAC 6 Clicks Score (PT): 8  AM-PAC 6 Clicks Score (OT): 8  PT Discharge Summary  Anticipated Discharge Disposition (PT): skilled nursing facility (SNF vs. 24/7 care or LTC facility if progress or participation does not improve)    Taty Frias PT  4/18/2023      Electronically signed by Taty Frias PT at 04/18/23 1032     Taty Frias PT at 04/18/23 0915   Version 2 of 3         Patient Name: Theresa Barrow  : 1950    MRN: [id number]                              Today's Date: 2023       Admit Date: 2023    Visit Dx:     ICD-10-CM ICD-9-CM   1. Accidental fall from bed, initial encounter  W06.XXXA E884.4   2. Multiple closed fractures of pelvis without disruption of pelvic ring, initial encounter  S32.82XA 808.44   3. Severe Alzheimer's dementia, unspecified timing of dementia onset, unspecified whether behavioral, psychotic, or mood disturbance or anxiety  G30.9 331.0    F02.C0 294.10   4. Bacteriuria with pyuria  R82.71 791.9    R82.81      Patient Active Problem List   Diagnosis   • Accidental fall from bed, initial encounter   • Dementia   • CKD (chronic kidney disease) stage 3, GFR 30-59 ml/min   • HLD (hyperlipidemia)   • OA (osteoarthritis)   • UTI (urinary tract infection)   • Pubic ramus fracture   • Sacral fracture   • Hypokalemia     Past Medical History:   Diagnosis Date   • Arthritis    • Chronic kidney disease    • Depression    • Urinary tract infection      History reviewed. No pertinent surgical history.   General Information     Row Name 23 1004          Physical Therapy Time and Intention    Document Type therapy note (daily note)  -     Mode of Treatment individual therapy;physical therapy  -     Row Name 23 1004          General Information    Patient Profile Reviewed yes  -LM     Existing Precautions/Restrictions fall;other (see comments)  L Superior Pubic Rami Fx; L Sacral Ala Fx; Protected WBAT LLE;  Advanced Dementia; Brief with OOB activity  -     Row Name 23 1004          Cognition    Orientation Status (Cognition) disoriented to;person;place;situation;time  Pt will look at you when you say her name  -     Row Name 23 1004          Safety Issues, Functional Mobility    Safety Issues Affecting Function (Mobility) ability to follow commands;awareness of need for assistance;friction/shear  risk;insight into deficits/self-awareness;judgment;positioning of assistive device;problem-solving;safety precaution awareness;safety precautions follow-through/compliance;sequencing abilities  -LM     Impairments Affecting Function (Mobility) balance;cognition;endurance/activity tolerance;pain;postural/trunk control;range of motion (ROM);strength  -LM           User Key  (r) = Recorded By, (t) = Taken By, (c) = Cosigned By    Initials Name Provider Type    LM Taty Frias, PT Physical Therapist               Mobility     Row Name 04/18/23 1006          Bed Mobility    Bed Mobility rolling left;rolling right;supine-sit;sit-supine  -LM     Rolling Left [place] (Bed Mobility) dependent (less than 25% patient effort);1 person assist;verbal cues;nonverbal cues (demo/gesture)  -LM     Rolling Right [place] (Bed Mobility) dependent (less than 25% patient effort);1 person assist;verbal cues;nonverbal cues (demo/gesture)  -LM     Supine-Sit [place] (Bed Mobility) dependent (less than 25% patient effort);2 person assist;verbal cues;nonverbal cues (demo/gesture)  -LM     Sit-Supine [place] (Bed Mobility) dependent (less than 25% patient effort);2 person assist;verbal cues;nonverbal cues (demo/gesture)  -LM     Assistive Device (Bed Mobility) bed rails;head of bed elevated  -LM     Comment, (Bed Mobility) Vc's for sequencing.  Pt screams extremely loudly with all transitional movements, the worst being with rolling.  Pt dependent x 2 d/t not initiating any movement.  -LM     Row Name 04/18/23 1006          Sit-Stand Transfer    Sit-Stand [place] (Transfers) maximum assist (25% patient effort);dependent (less than 25% patient effort);2 person assist;verbal cues  -LM     Assistive Device (Sit-Stand Transfers) walker, front-wheeled  -LM     Comment, (Sit-Stand Transfer) Stood x 2 reps from EOB - first from EOB with MaxAx2; second from EOB dependently x 2.  Vc's for sequencing.  Posterior lean noted.  -LM      Row Name 04/18/23 1006          Gait/Stairs (Locomotion)    [place] Level (Gait) not tested  -LM     Comment, (Gait/Stairs) Not safe to attempt on this date.  -LM           User Key  (r) = Recorded By, (t) = Taken By, (c) = Cosigned By    Initials Name Provider Type    Taty Mills PT Physical Therapist               Obj/Interventions     Row Name 04/18/23 1012          Motor Skills    Therapeutic Exercise --  Pt screams with attempted passive movement and resist all movemet.  Pt does not follow commands to complete active exercises.  -LM     Row Name 04/18/23 1012          Balance    Static Sitting Balance minimal assist  -LM     Position, Sitting Balance unsupported;sitting edge of bed  -LM     Static Standing Balance maximum assist;2-person assist;verbal cues  -LM     Position/Device Used, Standing Balance supported;walker, front-wheeled  -LM     Comment, Balance Pt would range from Yasir d/t posterior lean to SBA with static sitting balance.  -LM           User Key  (r) = Recorded By, (t) = Taken By, (c) = Cosigned By    Initials Name Provider Type    Taty Mills PT Physical Therapist               Goals/Plan    No documentation.                Clinical Impression     [facility] Name 04/18/23 1013          Pain    Pain Intervention(s) Repositioned;Ambulation/increased activity  -LM     Additional Documentation Pain Scale: FACES Pre/Post-Treatment (Group)  -LM     Row Name 04/18/23 1013          Pain Scale: FACES Pre/Post-Treatment    Pain: FACES Scale, Pretreatment 8-->hurts whole lot  -LM     Posttreatment Pain Rating 8-->hurts whole lot  -LM     Pain Location - Side/Orientation Left  -LM     Pain Location - hip  -LM     Pre/Posttreatment Pain Comment 0/10 with rest, but 8/10 with movement - pt screams and grimaces  -LM     Row Name 04/18/23 1013          Plan of Care Review    Plan of Care Reviewed With patient  -LM     Progress no change  -LM     Outcome Evaluation Pt significantly limited by  advanced dementia.  Pt required dependent x 2 assist with all bed mobility d/t pt not initiating any movement.  Pt screams extremely loudly and resist movement.  No active commands followed.  Pt did sit EOB ranging from SBA-Yasir d/t posterior lean.  Pt stood x 2 reps from EOB - first with MaxAx2; second dependently x 2 (again, d/t pt resisting).  Will decrease pt frequency to 3x/wk.  Will continue to recommend SNF at this time, but if no participation or functional progress noted, pt will be more appropriate for 24/7 care or LTC facility.  Will continue to follow.  -LM     Row Name 04/18/23 1013          Therapy Assessment/Plan (PT)    Rehab Potential (PT) fair, will monitor progress closely  -LM     Therapy Frequency (PT) 3 times/wk  -LM     Row Name 04/18/23 1013          Vital Signs    Post Systolic BP Rehab 161  -LM     Post Treatment Diastolic BP 78  -LM     Pretreatment Heart Rate (beats/min) 78  -LM     Posttreatment Heart Rate (beats/min) 72  -LM     Pre SpO2 (%) 100  -LM     O2 Delivery Pre Treatment supplemental O2  -LM     Intra SpO2 (%) 90  -LM     O2 Delivery Intra Treatment room air  -LM     Post SpO2 (%) 94  -LM     O2 Delivery Post Treatment supplemental O2  -LM     Pre Patient Position Supine  -LM     Post Patient Position Supine  -LM     Row Name 04/18/23 1013          Positioning and Restraints    Pre-Treatment Position in bed  -LM     Post Treatment Position bed  -LM     In Bed fowlers;call light within reach;encouraged to call for assist;exit alarm on;notified nsg  -LM           User Key  (r) = Recorded By, (t) = Taken By, (c) = Cosigned By    Initials Name Provider Type    LM Taty Frias, PT Physical Therapist               Outcome Measures     Row Name 04/18/23 1019 04/18/23 0915       How much help from another person do you currently need...    Turning from your back to your side while in flat bed without using bedrails? 1  -LM 1  -CB    Moving from lying on back to sitting on the side of  a flat bed without bedrails? 1  -LM 1  -CB    Moving to and from a bed to a chair (including a wheelchair)? 2  -LM 2  -CB    Standing up from a chair using your arms (e.g., wheelchair, bedside chair)? 2  -LM 2  -CB    Climbing 3-5 steps with a railing? 1  -LM 1  -CB    To walk in hospital room? 1  -LM 1  -CB    AM-PAC 6 Clicks Score (PT) 8  -LM 8  -CB    Highest level of mobility 3 --> Sat at edge of bed  -LM 3 --> Sat at edge of bed  -CB    Row Name 04/18/23 1019          Functional Assessment    Outcome Measure Options AM-PAC 6 Clicks Basic Mobility (PT)  -LM           User Key  (r) = Recorded By, (t) = Taken By, (c) = Cosigned By    Initials Name Provider Type    Taty Mills, PT Physical Therapist    Carrie Chau, RN Registered Nurse                             Physical Therapy Education     Title: PT OT SLP Therapies (Done)     Topic: Physical Therapy (Done)     Point: Mobility training (Done)     Learning Progress Summary           Patient Acceptance, E, VU,NR by LO at 4/17/2023 1041    Comment: PT POC, CG use of gait belt for home   Family Acceptance, E, VU,NR by LO at 4/17/2023 1041    Comment: PT POC, CG use of gait belt for home                   Point: Home exercise program (Done)     Learning Progress Summary           Patient Acceptance, E, VU,NR by LO at 4/17/2023 1041    Comment: PT POC, CG use of gait belt for home   Family Acceptance, E, VU,NR by LO at 4/17/2023 1041    Comment: PT POC, CG use of gait belt for home                   Point: Body mechanics (Done)     Learning Progress Summary           Patient Acceptance, E, VU,NR by LO at 4/17/2023 1041    Comment: PT POC, CG use of gait belt for home   Family Acceptance, E, VU,NR by LO at 4/17/2023 1041    Comment: PT POC, CG use of gait belt for home                   Point: Precautions (Done)     Learning Progress Summary           Patient Acceptance, E, VU,NR by LO at 4/17/2023 1041    Comment: PT POC, CG use of gait belt for home    Family Acceptance, E, VU,NR by KRISTEN at 4/17/2023 1041    Comment: PT POC, CG use of gait belt for home                               User Key     Initials Effective Dates Name Provider Type Discipline    KRISTEN 06/16/21 -  Mary Jane Avila, PT Physical Therapist PT              PT Recommendation and Plan     Plan of Care Reviewed With: patient  Progress: no change  Outcome Evaluation: Pt significantly limited by advanced dementia.  Pt required dependent x 2 assist with all bed mobility d/t pt not initiating any movement.  Pt screams extremely loudly and resist movement.  No active commands followed.  Pt did sit EOB ranging from SBA-Yasir d/t posterior lean.  Pt stood x 2 reps from EOB - first with MaxAx2; second dependently x 2 (again, d/t pt resisting).  Will decrease pt frequency to 3x/wk.  Will continue to recommend SNF at this time, but if no participation or functional progress noted, pt will be more appropriate for 24/7 care or LTC facility.  Will continue to follow.     Time Calculation:    PT Charges     Row Name 04/18/23 1019             Time Calculation    Start Time 0915  -LM      PT Received On 04/18/23  -LM      PT Goal Re-Cert Due Date 04/27/23  -LM         Timed Charges    61486 - PT Therapeutic Activity Minutes 45  -LM         Total Minutes    Timed Charges Total Minutes 45  -LM       Total Minutes 45  -LM            User Key  (r) = Recorded By, (t) = Taken By, (c) = Cosigned By    Initials Name Provider Type    LM Taty Frias PT Physical Therapist              Therapy Charges for Today     Code Description Service Date Service Provider Modifiers Qty    15178784079 HC PT THERAPEUTIC ACT EA 15 MIN 4/18/2023 Taty Frias PT GP 3          PT G-Codes  Outcome Measure Options: AM-PAC 6 Clicks Basic Mobility (PT)  AM-PAC 6 Clicks Score (PT): 8  PT Discharge Summary  Anticipated Discharge Disposition (PT): skilled nursing facility (SNF vs. 24/7 care or LTC facility if progress or participation does not  improve)    Taty Frias, PT  2023      Electronically signed by Taty Frias, PT at 23 1025     Taty Frias, PT at 23 9915  Version 1 of 3         Patient Name: Theresa Barrow  : 1950    MRN: [id number]                              Today's Date: 2023       Admit Date: 2023    Visit Dx:     ICD-10-CM ICD-9-CM   1. Accidental fall from bed, initial encounter  W06.XXXA E884.4   2. Multiple closed fractures of pelvis without disruption of pelvic ring, initial encounter  S32.82XA 808.44   3. Severe Alzheimer's dementia, unspecified timing of dementia onset, unspecified whether behavioral, psychotic, or mood disturbance or anxiety  G30.9 331.0    F02.C0 294.10   4. Bacteriuria with pyuria  R82.71 791.9    R82.81      Patient Active Problem List   Diagnosis   • Accidental fall from bed, initial encounter   • Dementia   • CKD (chronic kidney disease) stage 3, GFR 30-59 ml/min   • HLD (hyperlipidemia)   • OA (osteoarthritis)   • UTI (urinary tract infection)   • Pubic ramus fracture   • Sacral fracture   • Hypokalemia     Past Medical History:   Diagnosis Date   • Arthritis    • Chronic kidney disease    • Depression    • Urinary tract infection      History reviewed. No pertinent surgical history.   General Information     Row Name 23 1004          Physical Therapy Time and Intention    Document Type therapy note (daily note)  -LM     Mode of Treatment individual therapy;physical therapy  -     Row Name 23 1004          General Information    Patient Profile Reviewed yes  -LM     Existing Precautions/Restrictions fall;other (see comments)  L Superior Pubic Rami Fx; L Sacral Ala Fx; Protected WBAT LLE;  Advanced Dementia; Brief with OOB activity  -     Row Name 23 1004          Cognition    Orientation Status (Cognition) disoriented to;person;place;situation;time  Pt will look at you when you say her name  -     Row Name 23 1004          Safety Issues,  Functional Mobility    Safety Issues Affecting Function (Mobility) ability to follow commands;awareness of need for assistance;friction/shear risk;insight into deficits/self-awareness;judgment;positioning of assistive device;problem-solving;safety precaution awareness;safety precautions follow-through/compliance;sequencing abilities  -LM     Impairments Affecting Function (Mobility) balance;cognition;endurance/activity tolerance;pain;postural/trunk control;range of motion (ROM);strength  -LM           User Key  (r) = Recorded By, (t) = Taken By, (c) = Cosigned By    Initials Name Provider Type    LM Taty Frias, PT Physical Therapist               Mobility     Row Name 04/18/23 1006          Bed Mobility    Bed Mobility rolling left;rolling right;supine-sit;sit-supine  -LM     Rolling Left [place] (Bed Mobility) dependent (less than 25% patient effort);1 person assist;verbal cues;nonverbal cues (demo/gesture)  -LM     Rolling Right [place] (Bed Mobility) dependent (less than 25% patient effort);1 person assist;verbal cues;nonverbal cues (demo/gesture)  -LM     Supine-Sit [place] (Bed Mobility) dependent (less than 25% patient effort);2 person assist;verbal cues;nonverbal cues (demo/gesture)  -LM     Sit-Supine [place] (Bed Mobility) dependent (less than 25% patient effort);2 person assist;verbal cues;nonverbal cues (demo/gesture)  -LM     Assistive Device (Bed Mobility) bed rails;head of bed elevated  -LM     Comment, (Bed Mobility) Vc's for sequencing.  Pt screams extremely loudly with all transitional movements, the worst being with rolling.  Pt dependent x 2 d/t not initiating any movement.  -LM     Row Name 04/18/23 1006          Sit-Stand Transfer    Sit-Stand [place] (Transfers) maximum assist (25% patient effort);dependent (less than 25% patient effort);2 person assist;verbal cues  -LM     Assistive Device (Sit-Stand Transfers) walker, front-wheeled  -LM     Comment, (Sit-Stand  Transfer) Stood x 2 reps from EOB - first from EOB with MaxAx2; second from EOB dependently x 2.  Vc's for sequencing.  Posterior lean noted.  -LM     Row Name 04/18/23 1006          Gait/Stairs (Locomotion)    [place] Level (Gait) not tested  -LM     Comment, (Gait/Stairs) Not safe to attempt on this date.  -LM           User Key  (r) = Recorded By, (t) = Taken By, (c) = Cosigned By    Initials Name Provider Type    Taty Mills, SVETLANA Physical Therapist               Obj/Interventions     Row Name 04/18/23 1012          Motor Skills    Therapeutic Exercise --  Pt screams with attempted passive movement and resist all movemet.  Pt does not follow commands to complete active exercises.  -LM     Row Name 04/18/23 1012          Balance    Static Sitting Balance minimal assist  -LM     Position, Sitting Balance unsupported;sitting edge of bed  -LM     Static Standing Balance maximum assist;2-person assist;verbal cues  -LM     Position/Device Used, Standing Balance supported;walker, front-wheeled  -LM     Comment, Balance Pt would range from Yasir d/t posterior lean to SBA with static sitting balance.  -LM           User Key  (r) = Recorded By, (t) = Taken By, (c) = Cosigned By    Initials Name Provider Type    Taty Mills PT Physical Therapist               Goals/Plan    No documentation.                Clinical Impression     Row Name 04/18/23 1013          Pain    Pain Intervention(s) Repositioned;Ambulation/increased activity  -LM     Additional Documentation Pain Scale: FACES Pre/Post-Treatment (Group)  -     Row Name 04/18/23 1013          Pain Scale: FACES Pre/Post-Treatment    Pain: FACES Scale, Pretreatment 8-->hurts whole lot  -LM     Posttreatment Pain Rating 8-->hurts whole lot  -LM     Pain Location - Side/Orientation Left  -LM     Pain Location - hip  -LM     Pre/Posttreatment Pain Comment 0/10 with rest, but 8/10 with movement - pt screams and grimaces  -     Row Name 04/18/23 1013           Plan of Care Review    Plan of Care Reviewed With patient  -LM     Progress no change  -LM     Outcome Evaluation Pt significantly limited by advanced dementia.  Pt required dependent x 2 assist with all bed mobility d/t pt not initiating any movement.  Pt screams extremely loudly and resist movement.  No active commands followed.  Pt did sit EOB ranging from SBA-Yasir d/t posterior lean.  Pt stood x 2 reps from EOB - first with MaxAx2; second dependently x 2 (again, d/t pt resisting).  Will decrease pt frequency to 3x/wk.  Will continue to recommend SNF at this time, but if no participation or functional progress noted, pt will be more appropriate for 24/7 care or LTC facility.  Will continue to follow.  -LM     Row Name 04/18/23 1013          Vital Signs    Post Systolic BP Rehab 161  -LM     Post Treatment Diastolic BP 78  -LM     Pretreatment Heart Rate (beats/min) 78  -LM     Posttreatment Heart Rate (beats/min) 72  -LM     Pre SpO2 (%) 100  -LM     O2 Delivery Pre Treatment supplemental O2  -LM     Intra SpO2 (%) 90  -LM     O2 Delivery Intra Treatment room air  -LM     Post SpO2 (%) 94  -LM     O2 Delivery Post Treatment supplemental O2  -LM     Pre Patient Position Supine  -LM     Post Patient Position Supine  -LM     Row Name 04/18/23 1013          Positioning and Restraints    Pre-Treatment Position in bed  -LM     Post Treatment Position bed  -LM     In Bed fowlers;call light within reach;encouraged to call for assist;exit alarm on;notified nsg  -LM           User Key  (r) = Recorded By, (t) = Taken By, (c) = Cosigned By    Initials Name Provider Type    LM Taty Frias, PT Physical Therapist               Outcome Measures     Row Name 04/18/23 1019 04/18/23 0915       How much help from another person do you currently need...    Turning from your back to your side while in flat bed without using bedrails? 1  -LM 1  -CB    Moving from lying on back to sitting on the side of a flat bed without bedrails? 1   -LM 1  -CB    Moving to and from a bed to a chair (including a wheelchair)? 2  -LM 2  -CB    Standing up from a chair using your arms (e.g., wheelchair, bedside chair)? 2  -LM 2  -CB    Climbing 3-5 steps with a railing? 1  -LM 1  -CB    To walk in hospital room? 1  -LM 1  -CB    AM-PAC 6 Clicks Score (PT) 8  -LM 8  -CB    Highest level of mobility 3 --> Sat at edge of bed  -LM 3 --> Sat at edge of bed  -CB    Row Name 04/18/23 1019          Functional Assessment    Outcome Measure Options AM-PAC 6 Clicks Basic Mobility (PT)  -LM           User Key  (r) = Recorded By, (t) = Taken By, (c) = Cosigned By    Initials Name Provider Type    Taty Mills, PT Physical Therapist    Carrie Chau, RN Registered Nurse                             Physical Therapy Education     Title: PT OT SLP Therapies (Done)     Topic: Physical Therapy (Done)     Point: Mobility training (Done)     Learning Progress Summary           Patient Acceptance, E, VU,NR by  at 4/17/2023 1041    Comment: PT POC, CG use of gait belt for home   Family Acceptance, E, VU,NR by LO at 4/17/2023 1041    Comment: PT POC, CG use of gait belt for home                   Point: Home exercise program (Done)     Learning Progress Summary           Patient Acceptance, E, VU,NR by LO at 4/17/2023 1041    Comment: PT POC, CG use of gait belt for home   Family Acceptance, E, VU,NR by LO at 4/17/2023 1041    Comment: PT POC, CG use of gait belt for home                   Point: Body mechanics (Done)     Learning Progress Summary           Patient Acceptance, E, VU,NR by LO at 4/17/2023 1041    Comment: PT POC, CG use of gait belt for home   Family Acceptance, E, VU,NR by LO at 4/17/2023 1041    Comment: PT POC, CG use of gait belt for home                   Point: Precautions (Done)     Learning Progress Summary           Patient Acceptance, E, VU,NR by  at 4/17/2023 1041    Comment: PT POC, CG use of gait belt for home   Family Acceptance, E, VU,NR by  LO at 4/17/2023 1041    Comment: PT POC, CG use of gait belt for home                               User Key     Initials Effective Dates Name Provider Type Discipline    LO 06/16/21 -  Mary Jane Avila, PT Physical Therapist PT              PT Recommendation and Plan     Plan of Care Reviewed With: patient  Progress: no change  Outcome Evaluation: Pt significantly limited by advanced dementia.  Pt required dependent x 2 assist with all bed mobility d/t pt not initiating any movement.  Pt screams extremely loudly and resist movement.  No active commands followed.  Pt did sit EOB ranging from SBA-Yasir d/t posterior lean.  Pt stood x 2 reps from EOB - first with MaxAx2; second dependently x 2 (again, d/t pt resisting).  Will decrease pt frequency to 3x/wk.  Will continue to recommend SNF at this time, but if no participation or functional progress noted, pt will be more appropriate for 24/7 care or LTC facility.  Will continue to follow.     Time Calculation:    PT Charges     Row Name 04/18/23 1019             Time Calculation    Start Time 0915  -LM      PT Received On 04/18/23  -LM      PT Goal Re-Cert Due Date 04/27/23  -LM         Timed Charges    71645 - PT Therapeutic Activity Minutes 45  -LM         Total Minutes    Timed Charges Total Minutes 45  -LM       Total Minutes 45  -LM            User Key  (r) = Recorded By, (t) = Taken By, (c) = Cosigned By    Initials Name Provider Type     Taty Frias, PT Physical Therapist              Therapy Charges for Today     Code Description Service Date Service Provider Modifiers Qty    54809409032 HC PT THERAPEUTIC ACT EA 15 MIN 4/18/2023 Taty Frias, PT GP 3          PT G-Codes  Outcome Measure Options: AM-PAC 6 Clicks Basic Mobility (PT)  AM-PAC 6 Clicks Score (PT): 8  PT Discharge Summary  Anticipated Discharge Disposition (PT): skilled nursing facility (SNF vs. 24/7 care or LTC facility if progress or participation does not improve)    Taty Frias  PT  2023      Electronically signed by Taty Frias, PT at 23 1021          Occupational Therapy Notes (most recent note)      Amber Clemens, OT at 23          Patient Name: Theresa Barrow  : 1950    MRN: [id number]                              Today's Date: 2023       Admit Date: 2023    Visit Dx:     ICD-10-CM ICD-9-CM   1. Accidental fall from bed, initial encounter  W06.XXXA E884.4   2. Multiple closed fractures of pelvis without disruption of pelvic ring, initial encounter  S32.82XA 808.44   3. Severe Alzheimer's dementia, unspecified timing of dementia onset, unspecified whether behavioral, psychotic, or mood disturbance or anxiety  G30.9 331.0    F02.C0 294.10   4. Bacteriuria with pyuria  R82.71 791.9    R82.81      Patient Active Problem List   Diagnosis   • Accidental fall from bed, initial encounter   • Dementia   • CKD (chronic kidney disease) stage 3, GFR 30-59 ml/min   • HLD (hyperlipidemia)   • OA (osteoarthritis)   • UTI (urinary tract infection)   • Pubic ramus fracture   • Sacral fracture   • Hypokalemia     Past Medical History:   Diagnosis Date   • Arthritis    • Chronic kidney disease    • Depression    • Urinary tract infection      History reviewed. No pertinent surgical history.   General Information     Row Name 23          OT Time and Intention    Mode of Treatment occupational therapy  -AC     Row Name 23          General Information    Patient Profile Reviewed yes  -AC     Prior Level of Function independent:;gait;dependent:;ADL's  -AC     Existing Precautions/Restrictions fall;other (see comments);left;hip  advanced dementia, incontinent for bowel and bladder, acute L superior pubic ramus fx,L sacral fx ( protected WBAT)  -AC     Barriers to Rehab medically complex;previous functional deficit;cognitive status  -AC     Row Name 23          Living Environment    People in Home spouse  -AC     Row Name 23           Stairs Within Home, Primary    Stairs, Within Home, Primary enters home through garage ( no steps) can remain on this level if needed. Otherwise 7 steps up to bedrooms and 5 steps down to lower level  -     Row Name 04/18/23 0917          Cognition    Orientation Status (Cognition) disoriented to;person;place;situation;time  -     Row Name 04/18/23 0917          Safety Issues, Functional Mobility    Safety Issues Affecting Function (Mobility) ability to follow commands;awareness of need for assistance;friction/shear risk;insight into deficits/self-awareness;judgment;problem-solving;safety precaution awareness;safety precautions follow-through/compliance;sequencing abilities  -     Impairments Affecting Function (Mobility) balance;cognition;endurance/activity tolerance;pain;postural/trunk control;strength  -     Cognitive Impairments, Mobility Safety/Performance attention;insight into deficits/self-awareness;judgment;problem-solving/reasoning;safety precaution awareness;safety precaution follow-through;sequencing abilities  -           User Key  (r) = Recorded By, (t) = Taken By, (c) = Cosigned By    Initials Name Provider Type    AC Amber Clemens, OT Occupational Therapist                 Mobility/ADL's     Row Name 04/18/23 0917          Bed Mobility    Bed Mobility supine-sit;sit-supine;rolling left;rolling right  -AC     Rolling Left [place] (Bed Mobility) verbal cues;nonverbal cues (demo/gesture);dependent (less than 25% patient effort)  -AC     Rolling Right [place] (Bed Mobility) verbal cues;nonverbal cues (demo/gesture);dependent (less than 25% patient effort)  -AC     Supine-Sit [place] (Bed Mobility) verbal cues;dependent (less than 25% patient effort);2 person assist  -AC     Sit-Supine [place] (Bed Mobility) verbal cues;dependent (less than 25% patient effort);2 person assist  -AC     Assistive Device (Bed Mobility) draw sheet  -AC     Comment, (Bed Mobility) pt  cried out in pain with all mobility  -[facility] Name 04/18/23 0917          Transfers    Transfers sit-stand transfer  -[facility] 04/18/23 0917          Sit-Stand Transfer    Sit-Stand [place] (Transfers) verbal cues;maximum assist (25% patient effort);dependent (less than 25% patient effort);2 person assist  -     Assistive Device (Sit-Stand Transfers) walker, front-wheeled  -     Comment, (Sit-Stand Transfer) pt performed 2 STS - 1st time max a x 2, 2nd time dep x 2  -[facility] Name 04/18/23 0917          Activities of Daily Living    BADL Assessment/Intervention lower body dressing;grooming;feeding  -AC     Row Name 04/18/23 0917          Lower Body Dressing Assessment/Training    [place] Level (Lower Body Dressing) don;socks;dependent (less than 25% patient effort)  -     Position (Lower Body Dressing) supine  -AC     Row Name 04/18/23 0917          Grooming Assessment/Training    [place] Level (Grooming) wash face, hands;verbal cues;nonverbal cues (demo/gesture);minimum assist (75% patient effort)  -     Position (Grooming) sitting up in bed  -[facility] Name 04/18/23 0917          Self-Feeding Assessment/Training    [place] Level (Feeding) scoop food and bring to mouth;dependent (less than 25% patient effort)  -     Position (Self-Feeding) sitting up in bed  -     Comment, (Feeding) when food presented to mouth, pt would initlly hesitate and then take a bite and swallow  -           User Key  (r) = Recorded By, (t) = Taken By, (c) = Cosigned By    Initials Name Provider Type     Amber Clemens OT Occupational Therapist               Obj/Interventions     [facility] Name 04/18/23 1018          Sensory Assessment (Somatosensory)    Sensory Assessment (Somatosensory) unable/difficult to assess  -[facility] 04/18/23 1018          Vision Assessment/Intervention    Visual Impairment/Limitations unable/difficult to assess  -[facility] 04/18/23 1018          Range of  Motion Comprehensive    General Range of Motion bilateral upper extremity ROM WNL  -AC     Row Name 04/18/23 1018          Strength Comprehensive (MMT)    Comment, General Manual Muscle Testing (MMT) Assessment difficult to assess d/t cognition - at least 3/5  -     Row Name 04/18/23 1018          Balance    Balance Assessment sitting static balance  -AC     Static Sitting Balance minimal assist  -AC           User Key  (r) = Recorded By, (t) = Taken By, (c) = Cosigned By    Initials Name Provider Type    AC Amber Clemens OT Occupational Therapist               Goals/Plan     Row Name 04/18/23 1026          Bed Mobility Goal 1 (OT)    Activity/Assistive Device (Bed Mobility Goal 1, OT) rolling to left;rolling to right;sit to supine;supine to sit  -AC     [place] Level/Cues Needed (Bed Mobility Goal 1, OT) verbal cues required;moderate assist (50-74% patient effort)  -AC     Time Frame (Bed Mobility Goal 1, OT) by discharge  -AC     Progress/Outcomes (Bed Mobility Goal 1, OT) goal ongoing  -     Row Name 04/18/23 1026          Transfer Goal 1 (OT)    Activity/Assistive Device (Transfer Goal 1, OT) bed-to-chair/chair-to-bed;toilet;walker, rolling  -AC     [place] Level/Cues Needed (Transfer Goal 1, OT) moderate assist (50-74% patient effort)  -AC     Time Frame (Transfer Goal 1, OT) by discharge  -AC     Row Name 04/18/23 1026          Grooming Goal 1 (OT)    Activity/Device (Grooming Goal 1, OT) hair care  -AC     [place] (Grooming Goal 1, OT) verbal cues required;moderate assist (50-74% patient effort)  -AC     Time Frame (Grooming Goal 1, OT) by discharge  -AC     Strategies/Barriers (Grooming Goal 1, OT) seated EOB  -AC     Progress/Outcome (Grooming Goal 1, OT) goal ongoing  -     Row Name 04/18/23 1026          Self-Feeding Goal 1 (OT)    Activity/Device (Self-Feeding Goal 1, OT) scoop food and bring to mouth  -AC     [place] Level/Cues Needed (Self-Feeding Goal 1, OT) verbal cues  required;moderate assist (50-74% patient effort);nonverbal cues (demo/gesture) required;tactile cues required  -AC     Time Frame (Self-Feeding Goal 1, OT) by discharge  -AC     Progress/Outcomes (Self-Feeding Goal 1, OT) goal ongoing  -     Row Name 04/18/23 1026          Therapy Assessment/Plan (OT)    Planned Therapy Interventions (OT) activity tolerance training;BADL retraining;functional balance retraining;occupation/activity based interventions;patient/caregiver education/training;strengthening exercise;transfer/mobility retraining  -           User Key  (r) = Recorded By, (t) = Taken By, (c) = Cosigned By    Initials Name Provider Type    AC Amber Clemens, OT Occupational Therapist               Clinical Impression     Row Name 04/18/23 0917          Pain Scale: FACES Pre/Post-Treatment    Pain: FACES Scale, Pretreatment 8-->hurts whole lot  -AC     Posttreatment Pain Rating 8-->hurts whole lot  -AC     Pain Location - Side/Orientation Left  -AC     Pain Location - hip  -     Row Name 04/18/23 0917          Plan of Care Review    Plan of Care Reviewed With patient  -AC     Outcome Evaluation Pt presents with advanced dementia, pain, decreased activity tolerance and decreased balance limiting independence with self care.  Pt max/dep self feeding, dep LBD, min A to wash face, dep bed mobility,  max  x 2 first STS, and dep x 2 2nd STS using RW. OT will follow 3 x/wk to promote increased ind with self care/ mobility.  Recommend SNF if pt is able to increase level of participation, but if unable to increase participation, pt will require 24/7 care or ECF.  -     Row Name 04/18/23 0917          Therapy Assessment/Plan (OT)    Rehab Potential (OT) fair, will monitor progress closely  -AC     Criteria for Skilled Therapeutic Interventions Met (OT) yes;skilled treatment is necessary  -     Therapy Frequency (OT) 3 times/wk  -     Row Name 04/18/23 0917          Therapy Plan Review/Discharge Plan (OT)     Anticipated Discharge Disposition (OT) skilled nursing facility  -     Row Name 04/18/23 0917          Vital Signs    Pre Systolic BP Rehab 188  -AC     Pre Treatment Diastolic BP 79  -AC     Post Systolic BP Rehab 161  -AC     Post Treatment Diastolic BP 78  -AC     Pretreatment Heart Rate (beats/min) 75  -AC     Posttreatment Heart Rate (beats/min) 77  -AC     Pre SpO2 (%) 100  -AC     O2 Delivery Pre Treatment supplemental O2  -AC     Intra SpO2 (%) 90  -AC     O2 Delivery Intra Treatment room air  -AC     Post SpO2 (%) 91  -AC     O2 Delivery Post Treatment supplemental O2  -AC     Pre Patient Position Supine  -AC     Post Patient Position Supine  -AC     Row Name 04/18/23 0917          Positioning and Restraints    Pre-Treatment Position in bed  -AC     Post Treatment Position bed  -AC     In Bed notified nsg;fowlers;call light within reach;encouraged to call for assist;exit alarm on;pillow between legs  -AC           User Key  (r) = Recorded By, (t) = Taken By, (c) = Cosigned By    Initials Name Provider Type    AC Amber Clemens, OT Occupational Therapist               Outcome Measures     Row Name 04/18/23 1027          How much help from another is currently needed...    Putting on and taking off regular lower body clothing? 1  -AC     Bathing (including washing, rinsing, and drying) 1  -AC     Toileting (which includes using toilet bed pan or urinal) 1  -AC     Putting on and taking off regular upper body clothing 1  -AC     Taking care of personal grooming (such as brushing teeth) 2  -AC     Eating meals 2  -AC     AM-PAC 6 Clicks Score (OT) 8  -AC     Row Name 04/18/23 1019 04/18/23 0915       How much help from another person do you currently need...    Turning from your back to your side while in flat bed without using bedrails? 1  -LM 1  -CB    Moving from lying on back to sitting on the side of a flat bed without bedrails? 1  -LM 1  -CB    Moving to and from a bed to a chair (including a  wheelchair)? 2  -LM 2  -CB    Standing up from a chair using your arms (e.g., wheelchair, bedside chair)? 2  -LM 2  -CB    Climbing 3-5 steps with a railing? 1  -LM 1  -CB    To walk in hospital room? 1  -LM 1  -CB    AM-PAC 6 Clicks Score (PT) 8  -LM 8  -CB    Highest level of mobility 3 --> Sat at edge of bed  -LM 3 --> Sat at edge of bed  -CB    Row Name 04/18/23 1027 04/18/23 1019       Functional Assessment    Outcome Measure Options AM-PAC 6 Clicks Daily Activity (OT)  -AC AM-PAC 6 Clicks Basic Mobility (PT)  -LM          User Key  (r) = Recorded By, (t) = Taken By, (c) = Cosigned By    Initials Name Provider Type    AC Amber Clemens, OT Occupational Therapist    LM Taty Frias, PT Physical Therapist    Carrie Chau, RN Registered Nurse                Occupational Therapy Education     Title: PT OT SLP Therapies (In Progress)     Topic: Occupational Therapy (In Progress)     Point: ADL training (In Progress)     Description:   Instruct learner(s) on proper safety adaptation and remediation techniques during self care or transfers.   Instruct in proper use of assistive devices.              Learning Progress Summary           Patient Acceptance, E, NL by  at 4/18/2023 1028                   Point: Home exercise program (Not Started)     Description:   Instruct learner(s) on appropriate technique for monitoring, assisting and/or progressing therapeutic exercises/activities.              Learner Progress:  Not documented in this visit.          Point: Precautions (Not Started)     Description:   Instruct learner(s) on prescribed precautions during self-care and functional transfers.              Learner Progress:  Not documented in this visit.          Point: Body mechanics (Not Started)     Description:   Instruct learner(s) on proper positioning and spine alignment during self-care, functional mobility activities and/or exercises.              Learner Progress:  Not documented in this visit.                       User Key     Initials Effective Dates Name Provider Type Discipline    AC 02/03/23 -  Amber Clemens, OT Occupational Therapist OT              OT Recommendation and Plan  Planned Therapy Interventions (OT): activity tolerance training, BADL retraining, functional balance retraining, occupation/activity based interventions, patient/caregiver education/training, strengthening exercise, transfer/mobility retraining  Therapy Frequency (OT): 3 times/wk  Plan of Care Review  Plan of Care Reviewed With: patient  Outcome Evaluation: Pt presents with advanced dementia, pain, decreased activity tolerance and decreased balance limiting independence with self care.  Pt max/dep self feeding, dep LBD, min A to wash face, dep bed mobility,  max  x 2 first STS, and dep x 2 2nd STS using RW. OT will follow 3 x/wk to promote increased ind with self care/ mobility.  Recommend SNF if pt is able to increase level of participation, but if unable to increase participation, pt will require 24/7 care or ECF.     Time Calculation:    Time Calculation- OT     Row Name 04/18/23 0917             Time Calculation- OT    OT Start Time 0917  -AC      OT Received On 04/18/23  -AC      OT Goal Re-Cert Due Date 04/28/23  -AC         Timed Charges    17275 - OT Self Care/Mgmt Minutes 10  -AC         Untimed Charges    OT Eval/Re-eval Minutes 50  -AC         Total Minutes    Timed Charges Total Minutes 10  -AC      Untimed Charges Total Minutes 50  -AC       Total Minutes 60  -AC            User Key  (r) = Recorded By, (t) = Taken By, (c) = Cosigned By    Initials Name Provider Type    AC Amber Clemens, OT Occupational Therapist              Therapy Charges for Today     Code Description Service Date Service Provider Modifiers Qty    [id number]  OT SELF CARE/MGMT/TRAIN EA 15 MIN 4/18/2023 Amber Clemens, OT GO 1    [id number]  OT EVAL MOD COMPLEXITY 4 4/18/2023 Amber Clemens, OT GO 1               Amber Clemens  OT  4/18/2023    Electronically signed by Amber Clemens, OT at 04/18/23 1032

## 2023-04-18 NOTE — CASE MANAGEMENT/SOCIAL WORK
Continued Stay Note  [facility]     Patient Name: Theresa Barrow  MRN: [id number]  Today's Date: 4/18/2023    Admit Date: 4/16/2023    Plan: Ongoing   Discharge Plan     Row Name 04/18/23 1506       Plan    Plan Ongoing    Patient/Family in Agreement with Plan yes    Plan Comments I met with pt's  and dtr/Wendy, at the bedside. We discussed skilled rehab, long term care beds, assisted living, and home with hospice. Pt's spouse and daughter decided they would like to make referrals to [facility], [facility], and [facility] for skilled rehab bed. They also requested all [place] locations. I had already spoken with [facility] and explained to family that they have declined due to behaviors noted today in PT/OT notes. Current D/C plan is for skilled rehab and then Home with Hospice. Hospice team is following. Georgina spoke with family yesterday. I updated Georgina, by phone today. I gave family a map of all [facility] that offered skilled reha and long term care beds. I told Wendy I would have [facility] reach out to her for assistance they may be able to offer. Family will begin looking for private caregivers. I let family know I would bring a list of caregivers to them tomorrow. I left a voicemail for [facility], and [facility]. I spoke with Omar and faxed her the referral.  will continue to follow.    Final Discharge Disposition Code 30 - still a patient               Discharge Codes    No documentation.               Expected Discharge Date and Time     Expected Discharge Date Expected Discharge Time    Apr 21, 2023             Irina Pacheco RN

## 2023-04-18 NOTE — THERAPY EVALUATION
Patient Name: Theresa Barrow  : 1950    MRN: [id number]                              Today's Date: 2023       Admit Date: 2023    Visit Dx:     ICD-10-CM ICD-9-CM   1. Accidental fall from bed, initial encounter  W06.XXXA E884.4   2. Multiple closed fractures of pelvis without disruption of pelvic ring, initial encounter  S32.82XA 808.44   3. Severe Alzheimer's dementia, unspecified timing of dementia onset, unspecified whether behavioral, psychotic, or mood disturbance or anxiety  G30.9 331.0    F02.C0 294.10   4. Bacteriuria with pyuria  R82.71 791.9    R82.81      Patient Active Problem List   Diagnosis   • Accidental fall from bed, initial encounter   • Dementia   • CKD (chronic kidney disease) stage 3, GFR 30-59 ml/min   • HLD (hyperlipidemia)   • OA (osteoarthritis)   • UTI (urinary tract infection)   • Pubic ramus fracture   • Sacral fracture   • Hypokalemia     Past Medical History:   Diagnosis Date   • Arthritis    • Chronic kidney disease    • Depression    • Urinary tract infection      History reviewed. No pertinent surgical history.   General Information     Row Name 2317          OT Time and Intention    Mode of Treatment occupational therapy  -     Row Name 23          General Information    Patient Profile Reviewed yes  -AC     Prior Level of Function independent:;gait;dependent:;ADL's  -AC     Existing Precautions/Restrictions fall;other (see comments);left;hip  advanced dementia, incontinent for bowel and bladder, acute L superior pubic ramus fx,L sacral fx ( protected WBAT)  -     Barriers to Rehab medically complex;previous functional deficit;cognitive status  -     Row Name 2317          Living Environment    People in Home spouse  -     Row Name 2317          Stairs Within Home, Primary    Stairs, Within Home, Primary enters home through garage ( no steps) can remain on this level if needed. Otherwise 7 steps up to bedrooms and 5  steps down to lower level  -     Row Name 04/18/23 0917          Cognition    Orientation Status (Cognition) disoriented to;person;place;situation;time  -     Row Name 04/18/23 0917          Safety Issues, Functional Mobility    Safety Issues Affecting Function (Mobility) ability to follow commands;awareness of need for assistance;friction/shear risk;insight into deficits/self-awareness;judgment;problem-solving;safety precaution awareness;safety precautions follow-through/compliance;sequencing abilities  -     Impairments Affecting Function (Mobility) balance;cognition;endurance/activity tolerance;pain;postural/trunk control;strength  -     Cognitive Impairments, Mobility Safety/Performance attention;insight into deficits/self-awareness;judgment;problem-solving/reasoning;safety precaution awareness;safety precaution follow-through;sequencing abilities  -           User Key  (r) = Recorded By, (t) = Taken By, (c) = Cosigned By    Initials Name Provider Type    AC Amber Clemens, OT Occupational Therapist                 Mobility/ADL's     Row Name 04/18/23 0917          Bed Mobility    Bed Mobility supine-sit;sit-supine;rolling left;rolling right  -     Rolling Left [place] (Bed Mobility) verbal cues;nonverbal cues (demo/gesture);dependent (less than 25% patient effort)  -     Rolling Right [place] (Bed Mobility) verbal cues;nonverbal cues (demo/gesture);dependent (less than 25% patient effort)  -     Supine-Sit [place] (Bed Mobility) verbal cues;dependent (less than 25% patient effort);2 person assist  -     Sit-Supine [place] (Bed Mobility) verbal cues;dependent (less than 25% patient effort);2 person assist  -     Assistive Device (Bed Mobility) draw sheet  -     Comment, (Bed Mobility) pt cried out in pain with all mobility  -     Row Name 04/18/23 0917          Transfers    Transfers sit-stand transfer  -     Row Name 04/18/23 0917          Sit-Stand Transfer     Sit-Stand [place] (Transfers) verbal cues;maximum assist (25% patient effort);dependent (less than 25% patient effort);2 person assist  -     Assistive Device (Sit-Stand Transfers) walker, front-wheeled  -     Comment, (Sit-Stand Transfer) pt performed 2 STS - 1st time max a x 2, 2nd time dep x 2  -     Row Name 04/18/23 0917          Activities of Daily Living    BADL Assessment/Intervention lower body dressing;grooming;feeding  -     Row Name 04/18/23 0917          Lower Body Dressing Assessment/Training    [place] Level (Lower Body Dressing) don;socks;dependent (less than 25% patient effort)  -     Position (Lower Body Dressing) supine  -     Row Name 04/18/23 0917          Grooming Assessment/Training    [place] Level (Grooming) wash face, hands;verbal cues;nonverbal cues (demo/gesture);minimum assist (75% patient effort)  -     Position (Grooming) sitting up in bed  -[facility] Name 04/18/23 0917          Self-Feeding Assessment/Training    [place] Level (Feeding) scoop food and bring to mouth;dependent (less than 25% patient effort)  -     Position (Self-Feeding) sitting up in bed  -     Comment, (Feeding) when food presented to mouth, pt would initlly hesitate and then take a bite and swallow  -           User Key  (r) = Recorded By, (t) = Taken By, (c) = Cosigned By    Initials Name Provider Type     Amber Clemens, OT Occupational Therapist               Obj/Interventions     Row Name 04/18/23 1018          Sensory Assessment (Somatosensory)    Sensory Assessment (Somatosensory) unable/difficult to assess  -[facility] Name 04/18/23 1018          Vision Assessment/Intervention    Visual Impairment/Limitations unable/difficult to assess  -[facility] Name 04/18/23 1018          Range of Motion Comprehensive    General Range of Motion bilateral upper extremity ROM WNL  -     Row Name 04/18/23 1018          Strength Comprehensive (MMT)    Comment, General Manual Muscle  Testing (MMT) Assessment difficult to assess d/t cognition - at least 3/5  -     Row Name 04/18/23 1018          Balance    Balance Assessment sitting static balance  -AC     Static Sitting Balance minimal assist  -AC           User Key  (r) = Recorded By, (t) = Taken By, (c) = Cosigned By    Initials Name Provider Type    AC Amber Clemens, OT Occupational Therapist               Goals/Plan     Row Name 04/18/23 1026          Bed Mobility Goal 1 (OT)    Activity/Assistive Device (Bed Mobility Goal 1, OT) rolling to left;rolling to right;sit to supine;supine to sit  -AC     [place] Level/Cues Needed (Bed Mobility Goal 1, OT) verbal cues required;moderate assist (50-74% patient effort)  -AC     Time Frame (Bed Mobility Goal 1, OT) by discharge  -AC     Progress/Outcomes (Bed Mobility Goal 1, OT) goal ongoing  -     Row Name 04/18/23 1026          Transfer Goal 1 (OT)    Activity/Assistive Device (Transfer Goal 1, OT) bed-to-chair/chair-to-bed;toilet;walker, rolling  -AC     [place] Level/Cues Needed (Transfer Goal 1, OT) moderate assist (50-74% patient effort)  -AC     Time Frame (Transfer Goal 1, OT) by discharge  -     Row Name 04/18/23 1026          Grooming Goal 1 (OT)    Activity/Device (Grooming Goal 1, OT) hair care  -AC     [place] (Grooming Goal 1, OT) verbal cues required;moderate assist (50-74% patient effort)  -AC     Time Frame (Grooming Goal 1, OT) by discharge  -AC     Strategies/Barriers (Grooming Goal 1, OT) seated EOB  -AC     Progress/Outcome (Grooming Goal 1, OT) goal ongoing  -     Row Name 04/18/23 1026          Self-Feeding Goal 1 (OT)    Activity/Device (Self-Feeding Goal 1, OT) scoop food and bring to mouth  -AC     [place] Level/Cues Needed (Self-Feeding Goal 1, OT) verbal cues required;moderate assist (50-74% patient effort);nonverbal cues (demo/gesture) required;tactile cues required  -AC     Time Frame (Self-Feeding Goal 1, OT) by discharge  -AC      Progress/Outcomes (Self-Feeding Goal 1, OT) goal ongoing  -     Row Name 04/18/23 1026          Therapy Assessment/Plan (OT)    Planned Therapy Interventions (OT) activity tolerance training;BADL retraining;functional balance retraining;occupation/activity based interventions;patient/caregiver education/training;strengthening exercise;transfer/mobility retraining  -           User Key  (r) = Recorded By, (t) = Taken By, (c) = Cosigned By    Initials Name Provider Type     Amber Clemens, BETSY Occupational Therapist               Clinical Impression     Row Name 04/18/23 0917          Pain Scale: FACES Pre/Post-Treatment    Pain: FACES Scale, Pretreatment 8-->hurts whole lot  -AC     Posttreatment Pain Rating 8-->hurts whole lot  -AC     Pain Location - Side/Orientation Left  -     Pain Location - hip  -     Row Name 04/18/23 0917          Plan of Care Review    Plan of Care Reviewed With patient  -     Outcome Evaluation Pt presents with advanced dementia, pain, decreased activity tolerance and decreased balance limiting independence with self care.  Pt max/dep self feeding, dep LBD, min A to wash face, dep bed mobility,  max  x 2 first STS, and dep x 2 2nd STS using RW. OT will follow 3 x/wk to promote increased ind with self care/ mobility.  Recommend SNF if pt is able to increase level of participation, but if unable to increase participation, pt will require 24/7 care or ECF.  -     Row Name 04/18/23 0917          Therapy Assessment/Plan (OT)    Rehab Potential (OT) fair, will monitor progress closely  -     Criteria for Skilled Therapeutic Interventions Met (OT) yes;skilled treatment is necessary  -     Therapy Frequency (OT) 3 times/wk  -     Row Name 04/18/23 0917          Therapy Plan Review/Discharge Plan (OT)    Anticipated Discharge Disposition (OT) skilled nursing facility  -     Row Name 04/18/23 0917          Vital Signs    Pre Systolic BP Rehab 188  -     Pre Treatment Diastolic  BP 79  -AC     Post Systolic BP Rehab 161  -AC     Post Treatment Diastolic BP 78  -AC     Pretreatment Heart Rate (beats/min) 75  -AC     Posttreatment Heart Rate (beats/min) 77  -AC     Pre SpO2 (%) 100  -AC     O2 Delivery Pre Treatment supplemental O2  -AC     Intra SpO2 (%) 90  -AC     O2 Delivery Intra Treatment room air  -AC     Post SpO2 (%) 91  -AC     O2 Delivery Post Treatment supplemental O2  -AC     Pre Patient Position Supine  -AC     Post Patient Position Supine  -AC     Row Name 04/18/23 0917          Positioning and Restraints    Pre-Treatment Position in bed  -AC     Post Treatment Position bed  -AC     In Bed notified nsg;fowlers;call light within reach;encouraged to call for assist;exit alarm on;pillow between legs  -AC           User Key  (r) = Recorded By, (t) = Taken By, (c) = Cosigned By    Initials Name Provider Type    Amber Acosta, OT Occupational Therapist               Outcome Measures     Row Name 04/18/23 1027          How much help from another is currently needed...    Putting on and taking off regular lower body clothing? 1  -AC     Bathing (including washing, rinsing, and drying) 1  -AC     Toileting (which includes using toilet bed pan or urinal) 1  -AC     Putting on and taking off regular upper body clothing 1  -AC     Taking care of personal grooming (such as brushing teeth) 2  -AC     Eating meals 2  -AC     AM-PAC 6 Clicks Score (OT) 8  -AC     Row Name 04/18/23 1019 04/18/23 0915       How much help from another person do you currently need...    Turning from your back to your side while in flat bed without using bedrails? 1  -LM 1  -CB    Moving from lying on back to sitting on the side of a flat bed without bedrails? 1  -LM 1  -CB    Moving to and from a bed to a chair (including a wheelchair)? 2  -LM 2  -CB    Standing up from a chair using your arms (e.g., wheelchair, bedside chair)? 2  -LM 2  -CB    Climbing 3-5 steps with a railing? 1  -LM 1  -CB    To walk in  hospital room? 1  -LM 1  -CB    AM-PAC 6 Clicks Score (PT) 8  -LM 8  -CB    Highest level of mobility 3 --> Sat at edge of bed  -LM 3 --> Sat at edge of bed  -CB    Row Name 04/18/23 1027 04/18/23 1019       Functional Assessment    Outcome Measure Options AM-PAC 6 Clicks Daily Activity (OT)  - AM-PAC 6 Clicks Basic Mobility (PT)  -LM          User Key  (r) = Recorded By, (t) = Taken By, (c) = Cosigned By    Initials Name Provider Type     Amber Clemens, OT Occupational Therapist    LM Taty Frias, PT Physical Therapist    Carrie Chau, RN Registered Nurse                Occupational Therapy Education     Title: PT OT SLP Therapies (In Progress)     Topic: Occupational Therapy (In Progress)     Point: ADL training (In Progress)     Description:   Instruct learner(s) on proper safety adaptation and remediation techniques during self care or transfers.   Instruct in proper use of assistive devices.              Learning Progress Summary           Patient Acceptance, E, NL by  at 4/18/2023 1028                   Point: Home exercise program (Not Started)     Description:   Instruct learner(s) on appropriate technique for monitoring, assisting and/or progressing therapeutic exercises/activities.              Learner Progress:  Not documented in this visit.          Point: Precautions (Not Started)     Description:   Instruct learner(s) on prescribed precautions during self-care and functional transfers.              Learner Progress:  Not documented in this visit.          Point: Body mechanics (Not Started)     Description:   Instruct learner(s) on proper positioning and spine alignment during self-care, functional mobility activities and/or exercises.              Learner Progress:  Not documented in this visit.                      User Key     Initials Effective Dates Name Provider Type Discipline     02/03/23 -  Amber Clemens OT Occupational Therapist OT              OT Recommendation and  Plan  Planned Therapy Interventions (OT): activity tolerance training, BADL retraining, functional balance retraining, occupation/activity based interventions, patient/caregiver education/training, strengthening exercise, transfer/mobility retraining  Therapy Frequency (OT): 3 times/wk  Plan of Care Review  Plan of Care Reviewed With: patient  Outcome Evaluation: Pt presents with advanced dementia, pain, decreased activity tolerance and decreased balance limiting independence with self care.  Pt max/dep self feeding, dep LBD, min A to wash face, dep bed mobility,  max  x 2 first STS, and dep x 2 2nd STS using RW. OT will follow 3 x/wk to promote increased ind with self care/ mobility.  Recommend SNF if pt is able to increase level of participation, but if unable to increase participation, pt will require 24/7 care or ECF.     Time Calculation:    Time Calculation- OT     Row Name 04/18/23 0917             Time Calculation- OT    OT Start Time 0917  -AC      OT Received On 04/18/23  -AC      OT Goal Re-Cert Due Date 04/28/23  -AC         Timed Charges    49245 - OT Self Care/Mgmt Minutes 10  -AC         Untimed Charges    OT Eval/Re-eval Minutes 50  -AC         Total Minutes    Timed Charges Total Minutes 10  -AC      Untimed Charges Total Minutes 50  -AC       Total Minutes 60  -AC            User Key  (r) = Recorded By, (t) = Taken By, (c) = Cosigned By    Initials Name Provider Type    AC Amber Clemens, OT Occupational Therapist              Therapy Charges for Today     Code Description Service Date Service Provider Modifiers Qty    [id number]  OT SELF CARE/MGMT/TRAIN EA 15 MIN 4/18/2023 Amber Clemens, OT GO 1    [id number]  OT EVAL MOD COMPLEXITY 4 4/18/2023 Amber Clemens OT GO 1               Amber Clemens OT  4/18/2023

## 2023-04-18 NOTE — PROGRESS NOTES
Clinical Nutrition     Nutrition Support Assessment  Reason for Visit: Follow-up protocol      Patient Name: Theresa Barrow  YOB: 1950  MRN: [id number]  Date of Encounter: 04/18/23 11:37 EDT  Admission date: 4/16/2023      Nutrition Assessment   Admission Diagnosis:  Accidental fall from bed, initial encounter [W06.XXXA]    Problem List:    Accidental fall from bed, initial encounter    Dementia    CKD (chronic kidney disease) stage 3, GFR 30-59 ml/min    HLD (hyperlipidemia)    OA (osteoarthritis)    UTI (urinary tract infection)    Pubic ramus fracture    Sacral fracture    Hypokalemia      PMH:   She  has a past medical history of Arthritis, Chronic kidney disease, Depression, and Urinary tract infection.    PSH:  She  has no past surgical history on file.    Applicable Nutrition Concerns:   Skin:   Oral:  GI:    Applicable Interval History:     Reported/Observed/Food/Nutrition Related History:   4/18   at beside today, reports patient usually a really good eater at home. Her weight has been stable.  She lived at home with him and he was her main caregiver.  Typically not picky with food, eats whatever he prepares.  She needs assistance with meals.     4/17  Sleeping at time of visit, opened eyes when RD called name, however she did not respond. Patient with history of advance dementia and is total care.  is the patient caregiver.   not in room at time of visit.  Attempt to call , no answer.     Labs    Labs Reviewed: Yes     Results from last 7 days   Lab Units 04/17/23  1639 04/17/23  0510 04/16/23  1639   GLUCOSE mg/dL  --  112* 140*   BUN mg/dL  --  23 18   CREATININE mg/dL  --  1.34* 1.07*   SODIUM mmol/L  --  142 141   CHLORIDE mmol/L  --  104 103   POTASSIUM mmol/L 4.2 3.4* 3.4*   MAGNESIUM mg/dL  --  2.0  --    ALT (SGPT) U/L  --   --  15       Results from last 7 days   Lab Units 04/16/23  1639   ALBUMIN g/dL 3.4*         No results found for:  "HGBA1C          Medications    Medications Reviewed: Yes  Pertinent: yes   Infusion:  PRN:     Intake/Ouptut 24 hrs (0701 - 0700)   I&O's Reviewed: Yes       Anthropometrics     Flowsheet Rows    Flowsheet Row First Filed Value   Admission Height 162.6 cm (64\") Documented at 04/16/2023 1559   Admission Weight 63.5 kg (140 lb) Documented at 04/16/2023 1559        Height: Height: 162.6 cm (64\")  Last Filed Weight: Weight: 60.3 kg (133 lb) (04/16/23 2123)  Method: Weight Method: Bed scale  BMI: BMI (Calculated): 22.8  BMI classification: Normal: 18.5-24.9kg/m2  IBW:  120lb    UBW:    Weight      Weight (kg) Weight (lbs) Weight Method   3/9/2023   Stated    4/16/2023 63.504 kg  140 lb  Estimated     60.328 kg  133 lb  Bed scale      Weight change:     Nutrition Focused Physical Exam     Date: 4/17    Unable to perform exam due to: Pt unable to participate at time of visit  No family at bedside to give consent.     Current Nutrition Prescription     PO: Diet: Regular/House Diet; Texture: Regular Texture (IDDSI 7); Fluid Consistency: Thin (IDDSI 0)  Oral Nutrition Supplement:     Intake: Insufficient data  100% x 1 meal     Nutrition Diagnosis   Date: 4/17 Updated:   Problem Predicted suboptimal energy intake   Etiology Altered mental; advance dementia    Signs/Symptoms insuf intake    Status:     Goal:   General: Nutrition support treatment  PO: Establish PO    Nutrition Intervention      Follow treatment progress, Care plan reviewed     - patient does not meet criteria of MSA a this time.   -nursing staff to assist with meals.   - Monitor intake.       Monitoring/Evaluation:   Per protocol, I&O, Pertinent labs, Weight, POC/GOC      Catalina Clancy RD  Time Spent: 25min    "

## 2023-04-19 PROCEDURE — 25010000002 HYDROMORPHONE PER 4 MG: Performed by: INTERNAL MEDICINE

## 2023-04-19 PROCEDURE — G0378 HOSPITAL OBSERVATION PER HR: HCPCS

## 2023-04-19 PROCEDURE — 25010000002 CEFTRIAXONE PER 250 MG

## 2023-04-19 PROCEDURE — 97530 THERAPEUTIC ACTIVITIES: CPT | Performed by: PHYSICAL THERAPIST

## 2023-04-19 PROCEDURE — 97530 THERAPEUTIC ACTIVITIES: CPT

## 2023-04-19 PROCEDURE — 96376 TX/PRO/DX INJ SAME DRUG ADON: CPT

## 2023-04-19 PROCEDURE — 99231 SBSQ HOSP IP/OBS SF/LOW 25: CPT | Performed by: INTERNAL MEDICINE

## 2023-04-19 RX ADMIN — HYDROMORPHONE HYDROCHLORIDE 0.5 MG: 1 INJECTION, SOLUTION INTRAMUSCULAR; INTRAVENOUS; SUBCUTANEOUS at 09:49

## 2023-04-19 RX ADMIN — Medication 10 ML: at 09:29

## 2023-04-19 RX ADMIN — DONEPEZIL HYDROCHLORIDE 10 MG: 10 TABLET ORAL at 09:28

## 2023-04-19 RX ADMIN — MEMANTINE 10 MG: 10 TABLET ORAL at 09:28

## 2023-04-19 RX ADMIN — Medication 250 MG: at 09:28

## 2023-04-19 RX ADMIN — CITALOPRAM HYDROBROMIDE 20 MG: 20 TABLET ORAL at 09:28

## 2023-04-19 RX ADMIN — ACETAMINOPHEN 325MG 650 MG: 325 TABLET ORAL at 09:28

## 2023-04-19 RX ADMIN — Medication 10 ML: at 21:14

## 2023-04-19 RX ADMIN — Medication 50 MCG: at 09:30

## 2023-04-19 RX ADMIN — Medication 250 MG: at 21:13

## 2023-04-19 RX ADMIN — Medication 1 TABLET: at 09:30

## 2023-04-19 RX ADMIN — DONEPEZIL HYDROCHLORIDE 10 MG: 10 TABLET ORAL at 21:13

## 2023-04-19 RX ADMIN — HYDROMORPHONE HYDROCHLORIDE 0.5 MG: 1 INJECTION, SOLUTION INTRAMUSCULAR; INTRAVENOUS; SUBCUTANEOUS at 21:13

## 2023-04-19 RX ADMIN — CEFTRIAXONE 1 G: 1 INJECTION, POWDER, FOR SOLUTION INTRAMUSCULAR; INTRAVENOUS at 21:13

## 2023-04-19 NOTE — PLAN OF CARE
Goal Outcome Evaluation:  Plan of Care Reviewed With: patient        Progress: no change  Outcome Evaluation: Pt continues to be significantly limited by advanced dementia.  Dependent x 2 person assist needed with stand pivot transfer from recliner-->bed and sit-->supine.  Pt continues to cry out loudly with transitional movements.  Pt continues to be limited by anxiety, weakness, pain, decreased activity tolerance, and balance deficits.  Continue to recommend SNF at this time, but if no participation or functional progress noted, pt will be more appropriate for 24/7 care or LTC facility.  Will continue to follow.

## 2023-04-19 NOTE — PLAN OF CARE
Goal Outcome Evaluation:       Problem: Adult Inpatient Plan of Care  Goal: Plan of Care Review  Flowsheets (Taken 4/18/2023 1013 by Taty Frias, PT)  Progress: no change       Problem: Adult Inpatient Plan of Care  Goal: Absence of Hospital-Acquired Illness or Injury  Intervention: Identify and Manage Fall Risk  Recent Flowsheet Documentation  Taken 4/18/2023 2000 by Jazmine Mary RN  Safety Promotion/Fall Prevention: activity supervised  Intervention: Prevent Skin Injury  Recent Flowsheet Documentation  Taken 4/18/2023 2200 by Jazmine Mary RN  Body Position: supine  Taken 4/18/2023 2000 by Jazmine Mary RN  Body Position:   right   turned  Intervention: Prevent and Manage VTE (Venous Thromboembolism) Risk  Recent Flowsheet Documentation  Taken 4/18/2023 2000 by Jazmine Mary RN  Activity Management: bedrest     Problem: Adult Inpatient Plan of Care  Goal: Absence of Hospital-Acquired Illness or Injury  Intervention: Prevent Skin Injury  Recent Flowsheet Documentation  Taken 4/18/2023 2200 by Jazmine Mary RN  Body Position: supine  Taken 4/18/2023 2000 by Jazmine Mary RN  Body Position:   right   turned     Problem: Adult Inpatient Plan of Care  Goal: Absence of Hospital-Acquired Illness or Injury  Intervention: Prevent and Manage VTE (Venous Thromboembolism) Risk  Recent Flowsheet Documentation  Taken 4/18/2023 2000 by Jazmine Mary RN  Activity Management: bedrest

## 2023-04-19 NOTE — PLAN OF CARE
Goal Outcome Evaluation:  Plan of Care Reviewed With: patient        Progress: no change  Outcome Evaluation: Pt unable to make progress today with self care/ mobility d/t advanced dementia, pain with mobility, weakness, decreased balance and activity tolerance.  OT will continue 1-2 more visits to see if pt can increase level of particiaption.  Recommend SNF if pt can increase level of participation.

## 2023-04-19 NOTE — THERAPY TREATMENT NOTE
Patient Name: Theresa Barrow  : 1950    MRN: [id number]                              Today's Date: 2023       Admit Date: 2023    Visit Dx:     ICD-10-CM ICD-9-CM   1. Accidental fall from bed, initial encounter  W06.XXXA E884.4   2. Multiple closed fractures of pelvis without disruption of pelvic ring, initial encounter  S32.82XA 808.44   3. Severe Alzheimer's dementia, unspecified timing of dementia onset, unspecified whether behavioral, psychotic, or mood disturbance or anxiety  G30.9 331.0    F02.C0 294.10   4. Bacteriuria with pyuria  R82.71 791.9    R82.81      Patient Active Problem List   Diagnosis   • Accidental fall from bed, initial encounter   • Dementia   • CKD (chronic kidney disease) stage 3, GFR 30-59 ml/min   • HLD (hyperlipidemia)   • OA (osteoarthritis)   • UTI (urinary tract infection)   • Pubic ramus fracture   • Sacral fracture   • Hypokalemia     Past Medical History:   Diagnosis Date   • Arthritis    • Chronic kidney disease    • Depression    • Urinary tract infection      History reviewed. No pertinent surgical history.   General Information     Row Name 23 1544          Physical Therapy Time and Intention    Document Type therapy note (daily note)  -     Mode of Treatment physical therapy  -     Row Name 23 1544          General Information    Patient Profile Reviewed yes  -LM     Existing Precautions/Restrictions fall;other (see comments)  L Superior Pubic Rami Fx; L Sacral Ala Fx; Protected WBAT LLE; Advanced Dementia; Brief on with OOB activity  -     Row Name 23 1544          Cognition    Orientation Status (Cognition) disoriented to;person;place;situation;time  -     Row Name 23 1544          Safety Issues, Functional Mobility    Safety Issues Affecting Function (Mobility) ability to follow commands;friction/shear risk;insight into deficits/self-awareness;judgment;problem-solving;safety precaution awareness;safety precautions  follow-through/compliance;sequencing abilities  -LM     Impairments Affecting Function (Mobility) balance;cognition;endurance/activity tolerance;pain;postural/trunk control;strength;range of motion (ROM)  -LM           User Key  (r) = Recorded By, (t) = Taken By, (c) = Cosigned By    Initials Name Provider Type    LM Taty Frias PT Physical Therapist               Mobility     Row Name 04/19/23 1546          Bed Mobility    Sit-Supine [place] (Bed Mobility) dependent (less than 25% patient effort);2 person assist;verbal cues  -LM     Comment, (Bed Mobility) HOB flat; No BR  -LM     Row Name 04/19/23 1546          Bed-Chair Transfer    Bed-Chair [place] (Transfers) dependent (less than 25% patient effort);2 person assist;verbal cues  -LM     Assistive Device (Bed-Chair Transfers) other (see comments)  -LM     Comment, (Bed-Chair Transfer) Stand pivot transfer completed from recliner-->bed.  Pt with posterior lean and crying out during transitional movements.  -LM     Row Name 04/19/23 1546          Sit-Stand Transfer    Sit-Stand [place] (Transfers) dependent (less than 25% patient effort);2 person assist;verbal cues  -LM     Assistive Device (Sit-Stand Transfers) other (see comments)  BUE Support  -LM     Row Name 04/19/23 1546          Gait/Stairs (Locomotion)    [place] Level (Gait) not tested  -LM           User Key  (r) = Recorded By, (t) = Taken By, (c) = Cosigned By    Initials Name Provider Type    Taty Mills PT Physical Therapist               Obj/Interventions    No documentation.                Goals/Plan    No documentation.                Clinical Impression     Row Name 04/19/23 1547          Pain    Pain Intervention(s) Repositioned;Ambulation/increased activity  -LM     Row Name 04/19/23 1547          Pain Scale: FACES Pre/Post-Treatment    Pain: FACES Scale, Pretreatment 0-->no hurt  -LM     Posttreatment Pain Rating 0-->no hurt  -LM     Pain Location - Side/Orientation  Left  -LM     Pain Location - hip  -LM     Pre/Posttreatment Pain Comment 0/10 with rest, but 8/10 with transitional movements - pt screams and grimaces  -LM     Row Name 04/19/23 1547          Plan of Care Review    Plan of Care Reviewed With patient  -LM     Progress no change  -LM     Outcome Evaluation Pt continues to be significantly limited by advanced dementia.  Dependent x 2 person assist needed with stand pivot transfer from recliner-->bed and sit-->supine.  Pt continues to cry out loudly with transitional movements.  Pt continues to be limited by anxiety, weakness, pain, decreased activity tolerance, and balance deficits.  Continue to recommend SNF at this time, but if no participation or functional progress noted, pt will be more appropriate for 24/7 care or LTC facility.  Will continue to follow.  -     Row Name 04/19/23 1547          Vital Signs    Pre Systolic BP Rehab 145  -LM     Pre Treatment Diastolic BP 77  -LM     Pretreatment Heart Rate (beats/min) 71  -LM     Posttreatment Heart Rate (beats/min) 75  -LM     Row Name 04/19/23 1547          Positioning and Restraints    Pre-Treatment Position sitting in chair/recliner  -LM     Post Treatment Position bed  -LM     In Bed supine;call light within reach;encouraged to call for assist;exit alarm on;heels elevated;with family/caregiver;notified ns  -           User Key  (r) = Recorded By, (t) = Taken By, (c) = Cosigned By    Initials Name Provider Type    Taty Mills, PT Physical Therapist               Outcome Measures     Row Name 04/19/23 1551 04/19/23 0938       How much help from another person do you currently need...    Turning from your back to your side while in flat bed without using bedrails? 1  -LM 1  -CB    Moving from lying on back to sitting on the side of a flat bed without bedrails? 1  -LM 1  -CB    Moving to and from a bed to a chair (including a wheelchair)? 1  -LM 1  -CB    Standing up from a chair using your arms (e.g.,  wheelchair, bedside chair)? 1  -LM 1  -CB    Climbing 3-5 steps with a railing? 1  -LM 1  -CB    To walk in hospital room? 1  -LM 1  -CB    AM-PAC 6 Clicks Score (PT) 6  -LM 6  -CB    Highest level of mobility 2 --> Bed activities/dependent transfer  -LM 2 --> Bed activities/dependent transfer  -CB    Row Name 04/19/23 1551 04/19/23 1550       Functional Assessment    Outcome Measure Options AM-PAC 6 Clicks Basic Mobility (PT)  -LM AM-PAC 6 Clicks Daily Activity (OT)  -AC          User Key  (r) = Recorded By, (t) = Taken By, (c) = Cosigned By    Initials Name Provider Type    AC Amber Clemens, OT Occupational Therapist    LM Taty Frias, PT Physical Therapist    Carrie Chau, RN Registered Nurse                             Physical Therapy Education     Title: PT OT SLP Therapies (In Progress)     Topic: Physical Therapy (Done)     Point: Mobility training (Done)     Learning Progress Summary           Patient Acceptance, E, VU,NR by  at 4/17/2023 1041    Comment: PT POC, CG use of gait belt for home   Family Acceptance, E, VU,NR by LO at 4/17/2023 1041    Comment: PT POC, CG use of gait belt for home                   Point: Home exercise program (Done)     Learning Progress Summary           Patient Acceptance, E, VU,NR by LO at 4/17/2023 1041    Comment: PT POC, CG use of gait belt for home   Family Acceptance, E, VU,NR by LO at 4/17/2023 1041    Comment: PT POC, CG use of gait belt for home                   Point: Body mechanics (Done)     Learning Progress Summary           Patient Acceptance, E, VU,NR by LO at 4/17/2023 1041    Comment: PT POC, CG use of gait belt for home   Family Acceptance, E, VU,NR by LO at 4/17/2023 1041    Comment: PT POC, CG use of gait belt for home                   Point: Precautions (Done)     Learning Progress Summary           Patient Acceptance, E, VU,NR by  at 4/17/2023 1041    Comment: PT POC, CG use of gait belt for home   Family Acceptance, E, VU,NR by  at  4/17/2023 1041    Comment: PT POC, CG use of gait belt for home                               User Key     Initials Effective Dates Name Provider Type Discipline    LO 06/16/21 -  Mary Jane Avila, PT Physical Therapist PT              PT Recommendation and Plan     Plan of Care Reviewed With: patient  Progress: no change  Outcome Evaluation: Pt continues to be significantly limited by advanced dementia.  Dependent x 2 person assist needed with stand pivot transfer from recliner-->bed and sit-->supine.  Pt continues to cry out loudly with transitional movements.  Pt continues to be limited by anxiety, weakness, pain, decreased activity tolerance, and balance deficits.  Continue to recommend SNF at this time, but if no participation or functional progress noted, pt will be more appropriate for 24/7 care or LTC facility.  Will continue to follow.     Time Calculation:    PT Charges     Row Name 04/19/23 1552             Time Calculation    Start Time 1448  -LM      PT Received On 04/19/23  -LM      PT Goal Re-Cert Due Date 04/27/23  -LM         Timed Charges    93587 - PT Therapeutic Activity Minutes 11  -LM         Total Minutes    Timed Charges Total Minutes 11  -LM       Total Minutes 11  -LM            User Key  (r) = Recorded By, (t) = Taken By, (c) = Cosigned By    Initials Name Provider Type    LM Taty Frias, SVETLANA Physical Therapist              Therapy Charges for Today     Code Description Service Date Service Provider Modifiers Qty    83789477046 HC PT THERAPEUTIC ACT EA 15 MIN 4/18/2023 Taty Frias, PT GP 4    66067722183 HC PT THERAPEUTIC ACT EA 15 MIN 4/19/2023 Taty Frias, PT GP 1          PT G-Codes  Outcome Measure Options: AM-PAC 6 Clicks Basic Mobility (PT)  AM-PAC 6 Clicks Score (PT): 6  AM-PAC 6 Clicks Score (OT): 8  PT Discharge Summary  Anticipated Discharge Disposition (PT): skilled nursing facility (SNF vs. 24/7 care or LTC facility if progress or participation does not improve)    Taty  Robina, PT  4/19/2023

## 2023-04-19 NOTE — CASE MANAGEMENT/SOCIAL WORK
Continued Stay Note  [facility]     Patient Name: Theresa Barrow  MRN: [id number]  Today's Date: 4/19/2023    Admit Date: 4/16/2023    Plan: Ongoing   Discharge Plan     Row Name 04/19/23 1230       Plan    Plan Ongoing    Patient/Family in Agreement with Plan yes    Plan Comments I stopped by pt's room to give pt's  the caregiver list. I spoke with Claire/[facility]. Claire spoke with daughter today and helped get her information on [facility]. I updated pt's spouse about skilled rehab facilities. I spoke with Kristen/[facility] and [facility], she is speaking with DON today and will call CM back. I spoke with Suzanne/Mary, she has morning meeting, then will review pt's chart. I left a voicemail for [facility]. D/C plan is ongoing.  will continue to follow.    Final Discharge Disposition Code 30 - still a patient               Discharge Codes    No documentation.               Expected Discharge Date and Time     Expected Discharge Date Expected Discharge Time    Apr 21, 2023             Irina Pacheco RN

## 2023-04-19 NOTE — THERAPY TREATMENT NOTE
Patient Name: Theresa Barrow  : 1950    MRN: [id number]                              Today's Date: 2023       Admit Date: 2023    Visit Dx:     ICD-10-CM ICD-9-CM   1. Accidental fall from bed, initial encounter  W06.XXXA E884.4   2. Multiple closed fractures of pelvis without disruption of pelvic ring, initial encounter  S32.82XA 808.44   3. Severe Alzheimer's dementia, unspecified timing of dementia onset, unspecified whether behavioral, psychotic, or mood disturbance or anxiety  G30.9 331.0    F02.C0 294.10   4. Bacteriuria with pyuria  R82.71 791.9    R82.81      Patient Active Problem List   Diagnosis   • Accidental fall from bed, initial encounter   • Dementia   • CKD (chronic kidney disease) stage 3, GFR 30-59 ml/min   • HLD (hyperlipidemia)   • OA (osteoarthritis)   • UTI (urinary tract infection)   • Pubic ramus fracture   • Sacral fracture   • Hypokalemia     Past Medical History:   Diagnosis Date   • Arthritis    • Chronic kidney disease    • Depression    • Urinary tract infection      History reviewed. No pertinent surgical history.   General Information     Row Name 23 1452          OT Time and Intention    Document Type therapy note (daily note)  -AC     Mode of Treatment occupational therapy  -     Row Name 23 1452          General Information    Patient Profile Reviewed yes  -AC     Existing Precautions/Restrictions fall;other (see comments)  L Superior Pubic Rami Fx; L Sacral  Fx; Protected WBAT LLE; Advanced Dementia; Brief with OOB activity  -     Row Name 23 1452          Cognition    Orientation Status (Cognition) disoriented to;person;place;situation;time  -     Row Name 23 1452          Safety Issues, Functional Mobility    Safety Issues Affecting Function (Mobility) ability to follow commands;friction/shear risk;insight into deficits/self-awareness;judgment;problem-solving;safety precaution awareness;safety precautions  follow-through/compliance;sequencing abilities  -     Impairments Affecting Function (Mobility) balance;cognition;endurance/activity tolerance;pain;postural/trunk control;strength  -     Cognitive Impairments, Mobility Safety/Performance attention;insight into deficits/self-awareness;judgment;problem-solving/reasoning;safety precaution awareness;safety precaution follow-through;sequencing abilities  -AC           User Key  (r) = Recorded By, (t) = Taken By, (c) = Cosigned By    Initials Name Provider Type     Amber Clemens OT Occupational Therapist                 Mobility/ADL's     Row Name 04/19/23 1530          Bed Mobility    Bed Mobility sit-supine  -     Sit-Supine Bath (Bed Mobility) dependent (less than 25% patient effort);2 person assist;verbal cues;nonverbal cues (demo/gesture)  -     Comment, (Bed Mobility) bed fla  -     Row Name 04/19/23 1530          Transfers    Transfers sit-stand transfer;bed-chair transfer  -     Comment, (Transfers) Pt screams out with all mobility  -     Row Name 04/19/23 1530          Bed-Chair Transfer    Bed-Chair Bath (Transfers) verbal cues;dependent (less than 25% patient effort);2 person assist  chair to bed, stand pivot with pt pushing post  -AC     Assistive Device (Bed-Chair Transfers) other (see comments)  BUE support  -     Comment, (Bed-Chair Transfer) pt pushing  post  -AC     Row Name 04/19/23 1530          Sit-Stand Transfer    Sit-Stand Bath (Transfers) verbal cues;dependent (less than 25% patient effort);2 person assist  -     Assistive Device (Sit-Stand Transfers) other (see comments)  BUE support  -     Row Name 04/19/23 1530          Activities of Daily Living    BADL Assessment/Intervention grooming  -     Row Name 04/19/23 1530          Grooming Assessment/Training    Bath Level (Grooming) wash face, hands;verbal cues;minimum assist (75% patient effort);hair care, combing/brushing;moderate assist  (50% patient effort)  -AC     Assistive Devices (Grooming) hand over hand  -AC     Position (Grooming) supported sitting  -AC           User Key  (r) = Recorded By, (t) = Taken By, (c) = Cosigned By    Initials Name Provider Type    Amber Acosta, OT Occupational Therapist               Obj/Interventions    No documentation.                Goals/Plan    No documentation.                Clinical Impression     Row Name 04/19/23 1542          Pain Scale: FACES Pre/Post-Treatment    Pain: FACES Scale, Pretreatment 0-->no hurt  -AC     Posttreatment Pain Rating 0-->no hurt  -AC     Pre/Posttreatment Pain Comment 0/10 at rest, 8/10 with movement -  pt screams out with movement  -AC     Row Name 04/19/23 1542          Plan of Care Review    Plan of Care Reviewed With patient  -AC     Progress no change  -AC     Outcome Evaluation Pt unable to make progress today with self care/ mobility d/t advanced dementia, pain with mobility, weakness, decreased balance and activity tolerance.  OT will continue 1-2 more visits to see if pt can increase level of particiaption.  Recommend SNF if pt can increase level of participation.  -     Row Name 04/19/23 1542          Vital Signs    Pre Systolic BP Rehab 145  -AC     Pre Treatment Diastolic BP 77  -AC     Pretreatment Heart Rate (beats/min) 71  -AC     Posttreatment Heart Rate (beats/min) 75  -AC           User Key  (r) = Recorded By, (t) = Taken By, (c) = Cosigned By    Initials Name Provider Type    Amber Acosta, OT Occupational Therapist               Outcome Measures     Row Name 04/19/23 1550          How much help from another is currently needed...    Putting on and taking off regular lower body clothing? 1  -AC     Bathing (including washing, rinsing, and drying) 1  -AC     Toileting (which includes using toilet bed pan or urinal) 1  -AC     Putting on and taking off regular upper body clothing 1  -AC     Taking care of personal grooming (such as brushing teeth) 2   -AC     Eating meals 2  -AC     AM-PAC 6 Clicks Score (OT) 8  -AC     Row Name 04/19/23 0938          How much help from another person do you currently need...    Turning from your back to your side while in flat bed without using bedrails? 1  -CB     Moving from lying on back to sitting on the side of a flat bed without bedrails? 1  -CB     Moving to and from a bed to a chair (including a wheelchair)? 1  -CB     Standing up from a chair using your arms (e.g., wheelchair, bedside chair)? 1  -CB     Climbing 3-5 steps with a railing? 1  -CB     To walk in hospital room? 1  -CB     AM-PAC 6 Clicks Score (PT) 6  -CB     Highest level of mobility 2 --> Bed activities/dependent transfer  -CB     Row Name 04/19/23 1550          Functional Assessment    Outcome Measure Options AM-PAC 6 Clicks Daily Activity (OT)  -           User Key  (r) = Recorded By, (t) = Taken By, (c) = Cosigned By    Initials Name Provider Type    AC Amber Clemens, OT Occupational Therapist    Carrie Chau, RN Registered Nurse                Occupational Therapy Education     Title: PT OT SLP Therapies (In Progress)     Topic: Occupational Therapy (In Progress)     Point: ADL training (In Progress)     Description:   Instruct learner(s) on proper safety adaptation and remediation techniques during self care or transfers.   Instruct in proper use of assistive devices.              Learning Progress Summary           Patient Acceptance, E, NL by  at 4/19/2023 1550    Acceptance, E, NL by  at 4/18/2023 1028                   Point: Home exercise program (Not Started)     Description:   Instruct learner(s) on appropriate technique for monitoring, assisting and/or progressing therapeutic exercises/activities.              Learner Progress:  Not documented in this visit.          Point: Precautions (Not Started)     Description:   Instruct learner(s) on prescribed precautions during self-care and functional transfers.               Learner Progress:  Not documented in this visit.          Point: Body mechanics (Not Started)     Description:   Instruct learner(s) on proper positioning and spine alignment during self-care, functional mobility activities and/or exercises.              Learner Progress:  Not documented in this visit.                      User Key     Initials Effective Dates Name Provider Type Discipline     02/03/23 -  Amber Clemens, OT Occupational Therapist OT              OT Recommendation and Plan  Planned Therapy Interventions (OT): activity tolerance training, BADL retraining, functional balance retraining, occupation/activity based interventions, patient/caregiver education/training, strengthening exercise, transfer/mobility retraining  Therapy Frequency (OT): 3 times/wk  Plan of Care Review  Plan of Care Reviewed With: patient  Progress: no change  Outcome Evaluation: Pt unable to make progress today with self care/ mobility d/t advanced dementia, pain with mobility, weakness, decreased balance and activity tolerance.  OT will continue 1-2 more visits to see if pt can increase level of particiaption.  Recommend SNF if pt can increase level of participation.     Time Calculation:    Time Calculation- OT     Row Name 04/19/23 1452             Time Calculation- OT    OT Start Time 1452  -AC      OT Received On 04/19/23  -AC      OT Goal Re-Cert Due Date 04/28/23  -         Timed Charges    16827 - OT Therapeutic Activity Minutes 10  -AC      58756 - OT Self Care/Mgmt Minutes 5  -AC         Total Minutes    Timed Charges Total Minutes 15  -AC       Total Minutes 15  -AC            User Key  (r) = Recorded By, (t) = Taken By, (c) = Cosigned By    Initials Name Provider Type    AC Amber Clemens, OT Occupational Therapist              Therapy Charges for Today     Code Description Service Date Service Provider Modifiers Qty    17359686818  OT SELF CARE/MGMT/TRAIN EA 15 MIN 4/18/2023 Amber Clemens, OT GO 1    82742572529  HC OT EVAL MOD COMPLEXITY 4 4/18/2023 Amber Clemens, OT GO 1    00514186374 HC OT THERAPEUTIC ACT EA 15 MIN 4/19/2023 Amber Clemens OT GO 1               Amber Clemens OT  4/19/2023

## 2023-04-19 NOTE — PROGRESS NOTES
[facility] Medicine Services  PROGRESS NOTE    Patient Name: Theresa Barrow  : 1950  MRN: [id number]    Date of Admission: 2023  Primary Care Physician: Mukul Fontaine MD    Subjective   Subjective     CC:  Debility, fall    HPI:  Patient resting in bed. No changes overnight. No family at bedside.    ROS:  UTO due to dementia    Objective   Objective     Vital Signs:   Temp:  [97.3 °F (36.3 °C)-99.2 °F (37.3 °C)] 97.3 °F (36.3 °C)  Heart Rate:  [71-84] 73  Resp:  [14-18] 18  BP: (139-154)/(66-82) 145/77  Flow (L/min):  [1] 1     Physical Exam:  Constitutional: No acute distress, awake, alert  HENT: NCAT, mucous membranes moist  Respiratory: Clear to auscultation bilaterally, respiratory effort normal   Cardiovascular: RRR, no murmurs, rubs, or gallops  Gastrointestinal:soft, nontender, nondistended  Musculoskeletal: No bilateral ankle edema  Neurologic: severe dementia, able to answer simple yes/no questions, moves all ext.  Skin: No rashes    Exam unchanged from       Results Reviewed:  LAB RESULTS:      Lab 23  0510 23  1639   WBC 11.59* 14.83*   HEMOGLOBIN 9.7* 10.3*   HEMATOCRIT 30.6* 31.6*   PLATELETS 299 330   NEUTROS ABS 8.25* 12.48*   IMMATURE GRANS (ABS) 0.09* 0.08*   LYMPHS ABS 1.62 0.92   MONOS ABS 1.23* 1.17*   EOS ABS 0.32 0.09   MCV 98.7* 96.0   PROTIME  --  14.2   APTT  --  28.1*         Lab 23  1639 23  0510 23  1639   SODIUM  --  142 141   POTASSIUM 4.2 3.4* 3.4*   CHLORIDE  --  104 103   CO2  --  25.0 26.0   ANION GAP  --  13.0 12.0   BUN  --  23 18   CREATININE  --  1.34* 1.07*   EGFR  --  41.4* 54.3*   GLUCOSE  --  112* 140*   CALCIUM  --  8.5* 8.6   MAGNESIUM  --  2.0  --          Lab 23  1639   TOTAL PROTEIN 6.3   ALBUMIN 3.4*   GLOBULIN 2.9   ALT (SGPT) 15   AST (SGOT) 26   BILIRUBIN 0.6   ALK PHOS 114         Lab 23  1639   PROTIME 14.2   INR 1.10             Lab 23  1824 23  1711   ABO  TYPING A A   RH TYPING Positive Positive   ANTIBODY SCREEN  --  Negative         Brief Urine Lab Results  (Last result in the past 365 days)      Color   Clarity   Blood   Leuk Est   Nitrite   Protein   CREAT   Urine HCG        04/16/23 1709 Yellow   Turbid   Moderate (2+)   Large (3+)   Positive   100 mg/dL (2+)                 Microbiology Results Abnormal     None          No radiology results from the last 24 hrs        Current medications:  Scheduled Meds:Calcium Carb-Cholecalciferol, 1 tablet, Oral, Daily  cefTRIAXone, 1 g, Intravenous, Q24H  citalopram, 20 mg, Oral, Daily  divalproex, 125 mg, Oral, Daily  donepezil, 10 mg, Oral, BID  memantine, 10 mg, Oral, Daily  saccharomyces boulardii, 250 mg, Oral, BID  sodium chloride, 10 mL, Intravenous, Q12H  vitamin B-12, 50 mcg, Oral, Daily  vitamin D3, 5,000 Units, Oral, Daily      Continuous Infusions:   PRN Meds:.•  acetaminophen **OR** acetaminophen **OR** acetaminophen  •  HYDROmorphone  •  ondansetron **OR** ondansetron  •  Potassium Replacement - Follow Nurse / BPA Driven Protocol  •  [COMPLETED] Insert Peripheral IV **AND** sodium chloride  •  sodium chloride  •  sodium chloride    Assessment & Plan   Assessment & Plan     Active Hospital Problems    Diagnosis  POA   • **Accidental fall from bed, initial encounter [W06.XXXA]  Yes   • Dementia [F03.90]  Unknown   • CKD (chronic kidney disease) stage 3, GFR 30-59 ml/min [N18.30]  Unknown   • HLD (hyperlipidemia) [E78.5]  Unknown   • OA (osteoarthritis) [M19.90]  Unknown   • UTI (urinary tract infection) [N39.0]  Unknown   • Pubic ramus fracture [S32.599A]  Unknown   • Sacral fracture [S32.10XA]  Unknown   • Hypokalemia [E87.6]  Unknown      Resolved Hospital Problems   No resolved problems to display.        Brief Hospital Course to date:  Theresa Barrow is a 73 y.o. female PMH of CKD3, HLD, OA and dementia with advancing debility presents to the ED after a fall.      Fall  Pubic Ramus Fracture  Sacral  Fracture  -CT pelvis shows acute nondisplaced fractures involving the left superior pubic ramus and acute nondisplaced fracture of the left sacral ala  -Pain control PRN  -Conservative management, non-op  -WBAT and PT/OT as tolerated     Proteus UTI  -Rocephin      Anemia  -at baseline    Hypokalemia  -Replace per protocol     CKD3  - at baseline  -Avoid nephrotoxic agents  -Strict I/Os     Dementia  -Fall precautions    GOC  -  unsafe to care for patient, will need placement vs. Home with hospice care. Hospice following    Expected Discharge Location and Transportation:  Home with hospice vs LTC   Expected Discharge   Expected Discharge Date and Time     Expected Discharge Date Expected Discharge Time    Apr 21, 2023            DVT prophylaxis:  Mechanical DVT prophylaxis orders are present.     AM-PAC 6 Clicks Score (PT): 6 (04/19/23 0938)    CODE STATUS:   Code Status and Medical Interventions:   Ordered at: 04/16/23 2003     Medical Intervention Limits:    NO intubation (DNI)     Code Status (Patient has no pulse and is not breathing):    No CPR (Do Not Attempt to Resuscitate)     Medical Interventions (Patient has pulse or is breathing):    Limited Support       Martha Trinidad, DO  04/19/23

## 2023-04-20 PROCEDURE — 99231 SBSQ HOSP IP/OBS SF/LOW 25: CPT | Performed by: INTERNAL MEDICINE

## 2023-04-20 PROCEDURE — 96376 TX/PRO/DX INJ SAME DRUG ADON: CPT

## 2023-04-20 PROCEDURE — G0378 HOSPITAL OBSERVATION PER HR: HCPCS

## 2023-04-20 PROCEDURE — 25010000002 HYDROMORPHONE PER 4 MG: Performed by: INTERNAL MEDICINE

## 2023-04-20 PROCEDURE — 25010000002 CEFTRIAXONE PER 250 MG

## 2023-04-20 PROCEDURE — 96366 THER/PROPH/DIAG IV INF ADDON: CPT

## 2023-04-20 RX ADMIN — DONEPEZIL HYDROCHLORIDE 10 MG: 10 TABLET ORAL at 20:39

## 2023-04-20 RX ADMIN — MEMANTINE 10 MG: 10 TABLET ORAL at 09:29

## 2023-04-20 RX ADMIN — Medication 250 MG: at 09:29

## 2023-04-20 RX ADMIN — DIVALPROEX SODIUM 125 MG: 125 TABLET, DELAYED RELEASE ORAL at 09:29

## 2023-04-20 RX ADMIN — HYDROMORPHONE HYDROCHLORIDE 0.5 MG: 1 INJECTION, SOLUTION INTRAMUSCULAR; INTRAVENOUS; SUBCUTANEOUS at 17:40

## 2023-04-20 RX ADMIN — CEFTRIAXONE 1 G: 1 INJECTION, POWDER, FOR SOLUTION INTRAMUSCULAR; INTRAVENOUS at 20:39

## 2023-04-20 RX ADMIN — Medication 1 TABLET: at 09:29

## 2023-04-20 RX ADMIN — Medication 5000 UNITS: at 09:29

## 2023-04-20 RX ADMIN — ACETAMINOPHEN 325MG 650 MG: 325 TABLET ORAL at 09:29

## 2023-04-20 RX ADMIN — Medication 10 ML: at 20:40

## 2023-04-20 RX ADMIN — CITALOPRAM HYDROBROMIDE 20 MG: 20 TABLET ORAL at 09:29

## 2023-04-20 RX ADMIN — Medication 250 MG: at 20:39

## 2023-04-20 RX ADMIN — Medication 50 MCG: at 09:29

## 2023-04-20 RX ADMIN — DONEPEZIL HYDROCHLORIDE 10 MG: 10 TABLET ORAL at 09:29

## 2023-04-20 NOTE — CASE MANAGEMENT/SOCIAL WORK
Continued Stay Note   [facility]     Patient Name: Theresa Barrow  MRN: [id number]  Today's Date: 4/20/2023    Admit Date: 4/16/2023    Plan: home with hospice   Discharge Plan     Row Name 04/20/23 3777       Plan    Plan home with hospice    Patient/Family in Agreement with Plan yes    Plan Comments I spoke with family of this patient and she is agreeable to have her mother go home with hospice instead of going to rehab. [facility], with hospice, is following and will speak to the family at the bedside today to set up hospital bed, equipment, transport and EMS/DNR with  and family. Possible discharge on Friday. CM will continue to follow. Facilities notified of the discharge plan.    Final Discharge Disposition Code 50 - home with hospice               Discharge Codes    No documentation.               Expected Discharge Date and Time     Expected Discharge Date Expected Discharge Time    Apr 21, 2023             Hailey Gonzalez RN

## 2023-04-20 NOTE — PLAN OF CARE
Goal Outcome Evaluation:      Pt is very pleasant and cooperative, rested off and on overnight.. Pt is mostly non-verbal saying only a few words and making sounds. Pt is unable to answer questions. Pt will scream out in pain when being turned. Pain meds given as ordered. Plan is rehab vs. Home w/ Hospice, CM following. VSS on RA, will cont to monitor.

## 2023-04-20 NOTE — CONSULTS
[facility]  [address] [address]  [phone]       Orthopedic Consult    Patient: Theresa Barrow    Date of Admission: 4/16/2023  3:55 PM    YOB: 1950    Medical Record Number: [id number]    Attending Physician: Martha Trinidad DO    Consulting Physician: Brad Rico Jr, MD    Chief Complaint: Accidental fall from bed, initial encounter [W06.XXXA]    History of Present Illness: 73 y.o. female admitted to [facility] with Accidental fall from bed, initial encounter [W06.XXXA].      No Known Allergies     Home Medications:  Medications Prior to Admission   Medication Sig Dispense Refill Last Dose   • calcium carbonate (OS-ALEKSANDAR) 600 MG tablet Take 1 tablet by mouth Daily.      • citalopram (CeleXA) 20 MG tablet Take 1 tablet by mouth Daily.      • Denosumab (PROLIA SC) Inject  under the skin into the appropriate area as directed.      • divalproex (DEPAKOTE) 125 MG DR tablet Take 1 tablet by mouth Daily. Delayed release      • donepezil (ARICEPT) 10 MG tablet Take 1 tablet by mouth 2 (Two) Times a Day.      • memantine (NAMENDA) 10 MG tablet Take 1 tablet by mouth 2 (Two) Times a Day.      • saccharomyces boulardii (FLORASTOR) 250 MG capsule Take 1 capsule by mouth 2 (Two) Times a Day.      • vitamin B-12 (CYANOCOBALAMIN) 100 MCG tablet Take 50 mcg by mouth Daily.      • vitamin D3 125 MCG (5000 UT) capsule capsule Take 1 capsule by mouth Daily.          Current Medications:  Scheduled Meds:Calcium Carb-Cholecalciferol, 1 tablet, Oral, Daily  cefTRIAXone, 1 g, Intravenous, Q24H  citalopram, 20 mg, Oral, Daily  divalproex, 125 mg, Oral, Daily  donepezil, 10 mg, Oral, BID  memantine, 10 mg, Oral, Daily  saccharomyces boulardii, 250 mg, Oral, BID  sodium chloride, 10 mL, Intravenous, Q12H  vitamin B-12, 50 mcg, Oral, Daily  vitamin D3, 5,000 Units, Oral, Daily      Continuous Infusions:   PRN Meds:.•  acetaminophen **OR** acetaminophen **OR** acetaminophen  •   HYDROmorphone  •  ondansetron **OR** ondansetron  •  Potassium Replacement - Follow Nurse / BPA Driven Protocol  •  [COMPLETED] Insert Peripheral IV **AND** sodium chloride  •  sodium chloride  •  sodium chloride    Past Medical History:   Diagnosis Date   • Arthritis    • Chronic kidney disease    • Depression    • Urinary tract infection       History reviewed. No pertinent surgical history.     Social History     Occupational History   • Not on file   Tobacco Use   • Smoking status: Never     Passive exposure: Never   • Smokeless tobacco: Never   Vaping Use   • Vaping Use: Never used   Substance and Sexual Activity   • Alcohol use: Never   • Drug use: Never   • Sexual activity: Defer      Social History     Social History Narrative   • Not on file        Family History   Problem Relation Age of Onset   • Heart disease Mother    • Cancer Father          Review of Systems:   HEENT: Patient denies any headaches, vision changes, change in hearing, or tinnitus, Patient denies any rhinorrhea,epistaxis, sinus pain, mouth or dental problems, sore throat or hoarseness, or dysphagia  Pulmonary: Patient denies any cough, congestion, SOA, or wheezing  Cardiovascular: Patient denies any chest pain, dyspnea, palpitations, weakness, intolerance of exercise, varicosities, swelling of extremities, known murmur  Gastrointestinal:  Patient denies nausea, vomiting, diarrhea, constipation, loss  of appetite, change in appetite, dysphagia, gas, heartburn, melena, change in bowel habits, use of laxatives or other drugs to alter the function of the gastrointestinal tract.  Genital/Urinary: Patient denies dysuria, change in color of urine, change in frequency of urination, pain with urgency, incontinence, retention, or nocturia.  Musculoskeletal: Patient denies increased warmth; redness; or swelling of joints; limitation of function; deformity; crepitation: pain in a joint or an extremity, the neck, or the back, especially with  movement.  Neurological: Patient denies dizziness, tremor, ataxia, difficulty in speaking, change in speech, paresthesia, loss of sensation, seizures, syncope, changes in memory.  Endocrine system: Patient denies tremors, palpitations, intolerance of heat or cold, polyuria, polydipsia, polyphagia, diaphoresis, exophthalmos, or goiter.  Psychological: Patient denies thoughts/plans or harming self or other; depression,  insomnia, night terrors, rosario, memory loss, disorientation.  Skin: Patient denies any bruising, rashes, discoloration, pruritus, wounds, ulcers, decubiti, changes in the hair or nails  Hematopoietic: Patient denies history of spontaneous or excessive bleeding, epistaxis, hematuria, melena, fatigue, enlarged or tender lymph nodes, pallor, history of anemia.    Physical Exam: 73 y.o. female  General Appearance:    Alert, cooperative, in no acute distress                   Vitals:    04/19/23 1551 04/19/23 2300 04/20/23 0300 04/20/23 0734   BP: 140/58 153/80 169/79 142/70   BP Location: Right arm Right arm Right arm Right arm   Patient Position: Lying Lying Lying Lying   Pulse: 70 70 73 55   Resp: 18 18 18 18   Temp: 98.4 °F (36.9 °C) 98.6 °F (37 °C) 98.2 °F (36.8 °C) 98.3 °F (36.8 °C)   TempSrc: Axillary Axillary Axillary Oral   SpO2: 98% 95%     Weight:       Height:            Head:    Normocephalic, without obvious abnormality, atraumatic   Eyes:            Lids and lashes normal, conjunctivae and sclerae normal, no   icterus, no pallor, corneas clear, PERRLA   Ears:    Ears appear intact with no abnormalities noted   Throat:   No oral lesions, no thrush, oral mucosa moist   Back:     No C-T-L spine tenderness   Lungs:     Clear to auscultation,respirations regular, even and                  unlabored   Chest Wall:    No abnormalities observed   Abdomen:     Normal bowel sounds, no masses, no organomegaly, soft        non-tender, non-distended, no guarding, no rebound                tenderness    Extremities:   Stable to AP / lateral pelvic compression   Pulses:   Pulses palpable and equal bilaterally   Skin:   No bleeding, bruising or rash   Lymph nodes:   No palpable adenopathy   Neurologic:   Cranial nerves 2 - 12 grossly intact, sensation intact, DTR       present and equal bilaterally           Diagnostic Tests:    Admission on 04/16/2023   Component Date Value Ref Range Status   • Glucose 04/16/2023 140 (H)  65 - 99 mg/dL Final   • BUN 04/16/2023 18  8 - 23 mg/dL Final   • Creatinine 04/16/2023 1.07 (H)  0.57 - 1.00 mg/dL Final   • Sodium 04/16/2023 141  136 - 145 mmol/L Final   • Potassium 04/16/2023 3.4 (L)  3.5 - 5.2 mmol/L Final    Slight hemolysis detected by analyzer. Results may be affected.   • Chloride 04/16/2023 103  98 - 107 mmol/L Final   • CO2 04/16/2023 26.0  22.0 - 29.0 mmol/L Final   • Calcium 04/16/2023 8.6  8.6 - 10.5 mg/dL Final   • Total Protein 04/16/2023 6.3  6.0 - 8.5 g/dL Final   • Albumin 04/16/2023 3.4 (L)  3.5 - 5.2 g/dL Final   • ALT (SGPT) 04/16/2023 15  1 - 33 U/L Final   • AST (SGOT) 04/16/2023 26  1 - 32 U/L Final   • Alkaline Phosphatase 04/16/2023 114  39 - 117 U/L Final   • Total Bilirubin 04/16/2023 0.6  0.0 - 1.2 mg/dL Final   • Globulin 04/16/2023 2.9  gm/dL Final    Calculated Result   • A/G Ratio 04/16/2023 1.2  g/dL Final   • BUN/Creatinine Ratio 04/16/2023 16.8  7.0 - 25.0 Final   • Anion Gap 04/16/2023 12.0  5.0 - 15.0 mmol/L Final   • eGFR 04/16/2023 54.3 (L)  >60.0 mL/min/1.73 Final   • Protime 04/16/2023 14.2  11.4 - 14.4 Seconds Final   • INR 04/16/2023 1.10  0.84 - 1.13 Final   • PTT 04/16/2023 28.1 (L)  60.0 - 90.0 seconds Final   • Color, UA 04/16/2023 Yellow  Yellow, Straw Final   • Appearance, UA 04/16/2023 Turbid (A)  Clear Final   • pH, UA 04/16/2023 7.0  5.0 - 8.0 Final   • Specific Gravity, UA 04/16/2023 1.026  1.001 - 1.030 Final   • Glucose, UA 04/16/2023 Negative  Negative Final   • Ketones, UA 04/16/2023 15 mg/dL (1+) (A)  Negative Final   •  Bilirubin, UA 04/16/2023 Negative  Negative Final   • Blood, UA 04/16/2023 Moderate (2+) (A)  Negative Final   • Protein, UA 04/16/2023 100 mg/dL (2+) (A)  Negative Final   • Leuk Esterase, UA 04/16/2023 Large (3+) (A)  Negative Final   • Nitrite, UA 04/16/2023 Positive (A)  Negative Final   • Urobilinogen, UA 04/16/2023 1.0 E.U./dL  0.2 - 1.0 E.U./dL Final   • ABO Type 04/16/2023 A   Final   • RH type 04/16/2023 Positive   Final   • Antibody Screen 04/16/2023 Negative   Final   • T&S Expiration Date 04/16/2023 4/19/2023 11:59:59 PM   Final   • QT Interval 04/16/2023 428  ms Final   • QTC Interval 04/16/2023 455  ms Final   • WBC 04/16/2023 14.83 (H)  3.40 - 10.80 10*3/mm3 Final   • RBC 04/16/2023 3.29 (L)  3.77 - 5.28 10*6/mm3 Final   • Hemoglobin 04/16/2023 10.3 (L)  12.0 - 15.9 g/dL Final   • Hematocrit 04/16/2023 31.6 (L)  34.0 - 46.6 % Final   • MCV 04/16/2023 96.0  79.0 - 97.0 fL Final   • MCH 04/16/2023 31.3  26.6 - 33.0 pg Final   • MCHC 04/16/2023 32.6  31.5 - 35.7 g/dL Final   • RDW 04/16/2023 13.0  12.3 - 15.4 % Final   • RDW-SD 04/16/2023 45.7  37.0 - 54.0 fl Final   • MPV 04/16/2023 10.0  6.0 - 12.0 fL Final   • Platelets 04/16/2023 330  140 - 450 10*3/mm3 Final   • Neutrophil % 04/16/2023 84.2 (H)  42.7 - 76.0 % Final   • Lymphocyte % 04/16/2023 6.2 (L)  19.6 - 45.3 % Final   • Monocyte % 04/16/2023 7.9  5.0 - 12.0 % Final   • Eosinophil % 04/16/2023 0.6  0.3 - 6.2 % Final   • Basophil % 04/16/2023 0.6  0.0 - 1.5 % Final   • Immature Grans % 04/16/2023 0.5  0.0 - 0.5 % Final   • Neutrophils, Absolute 04/16/2023 12.48 (H)  1.70 - 7.00 10*3/mm3 Final   • Lymphocytes, Absolute 04/16/2023 0.92  0.70 - 3.10 10*3/mm3 Final   • Monocytes, Absolute 04/16/2023 1.17 (H)  0.10 - 0.90 10*3/mm3 Final   • Eosinophils, Absolute 04/16/2023 0.09  0.00 - 0.40 10*3/mm3 Final   • Basophils, Absolute 04/16/2023 0.09  0.00 - 0.20 10*3/mm3 Final   • Immature Grans, Absolute 04/16/2023 0.08 (H)  0.00 - 0.05 10*3/mm3 Final   •  nRBC 04/16/2023 0.0  0.0 - 0.2 /100 WBC Final   • RBC, UA 04/16/2023 0-2  None Seen, 0-2 /HPF Final   • WBC, UA 04/16/2023 Too Numerous to Count (A)  None Seen, 0-2 /HPF Final   • Bacteria, UA 04/16/2023 4+ (A)  None Seen, Trace /HPF Final   • Squamous Epithelial Cells, UA 04/16/2023 0-2  None Seen, 0-2 /HPF Final   • Hyaline Casts, UA 04/16/2023 0-6  0 - 6 /LPF Final   • Methodology 04/16/2023 Manual Light Microscopy   Final   • ABO Type 04/16/2023 A   Final   • RH type 04/16/2023 Positive   Final   • Urine Culture 04/16/2023 >100,000 CFU/mL Proteus mirabilis (A)   Final   • Glucose 04/17/2023 112 (H)  65 - 99 mg/dL Final   • BUN 04/17/2023 23  8 - 23 mg/dL Final   • Creatinine 04/17/2023 1.34 (H)  0.57 - 1.00 mg/dL Final   • Sodium 04/17/2023 142  136 - 145 mmol/L Final   • Potassium 04/17/2023 3.4 (L)  3.5 - 5.2 mmol/L Final   • Chloride 04/17/2023 104  98 - 107 mmol/L Final   • CO2 04/17/2023 25.0  22.0 - 29.0 mmol/L Final   • Calcium 04/17/2023 8.5 (L)  8.6 - 10.5 mg/dL Final   • BUN/Creatinine Ratio 04/17/2023 17.2  7.0 - 25.0 Final   • Anion Gap 04/17/2023 13.0  5.0 - 15.0 mmol/L Final   • eGFR 04/17/2023 41.4 (L)  >60.0 mL/min/1.73 Final   • WBC 04/17/2023 11.59 (H)  3.40 - 10.80 10*3/mm3 Final   • RBC 04/17/2023 3.10 (L)  3.77 - 5.28 10*6/mm3 Final   • Hemoglobin 04/17/2023 9.7 (L)  12.0 - 15.9 g/dL Final   • Hematocrit 04/17/2023 30.6 (L)  34.0 - 46.6 % Final   • MCV 04/17/2023 98.7 (H)  79.0 - 97.0 fL Final   • MCH 04/17/2023 31.3  26.6 - 33.0 pg Final   • MCHC 04/17/2023 31.7  31.5 - 35.7 g/dL Final   • RDW 04/17/2023 13.1  12.3 - 15.4 % Final   • RDW-SD 04/17/2023 46.5  37.0 - 54.0 fl Final   • MPV 04/17/2023 10.3  6.0 - 12.0 fL Final   • Platelets 04/17/2023 299  140 - 450 10*3/mm3 Final   • Neutrophil % 04/17/2023 71.1  42.7 - 76.0 % Final   • Lymphocyte % 04/17/2023 14.0 (L)  19.6 - 45.3 % Final   • Monocyte % 04/17/2023 10.6  5.0 - 12.0 % Final   • Eosinophil % 04/17/2023 2.8  0.3 - 6.2 % Final   •  Basophil % 04/17/2023 0.7  0.0 - 1.5 % Final   • Immature Grans % 04/17/2023 0.8 (H)  0.0 - 0.5 % Final   • Neutrophils, Absolute 04/17/2023 8.25 (H)  1.70 - 7.00 10*3/mm3 Final   • Lymphocytes, Absolute 04/17/2023 1.62  0.70 - 3.10 10*3/mm3 Final   • Monocytes, Absolute 04/17/2023 1.23 (H)  0.10 - 0.90 10*3/mm3 Final   • Eosinophils, Absolute 04/17/2023 0.32  0.00 - 0.40 10*3/mm3 Final   • Basophils, Absolute 04/17/2023 0.08  0.00 - 0.20 10*3/mm3 Final   • Immature Grans, Absolute 04/17/2023 0.09 (H)  0.00 - 0.05 10*3/mm3 Final   • nRBC 04/17/2023 0.0  0.0 - 0.2 /100 WBC Final   • Magnesium 04/17/2023 2.0  1.6 - 2.4 mg/dL Final   • Potassium 04/17/2023 4.2  3.5 - 5.2 mmol/L Final       CT Pelvis Without Contrast    Result Date: 4/16/2023  Narrative: CT PELVIS WO CONTRAST Date of Exam: 4/16/2023 5:36 PM EDT Indication: fall from bed with significant left hip/pelvis pain. Comparison: Radiograph 4/16/2023 Technique: Axial CT images were obtained of the pelvis without contrast administration.  Reconstructed coronal and sagittal images were also obtained. Automated exposure control and iterative construction methods were used. Findings: There is some minimal buckling of the superior pubic ramus on the left consistent with a nondisplaced fracture. This is not seen even in retrospect on prior radiograph. There is also buckling of the left sacral ala consistent with a nondisplaced fracture. The sacroiliac joints appear intact. There is mild narrowing of the hip joint spaces bilaterally. Left proximal femur appears intact. Visualized portions the right femur also appear intact. Soft tissues of the pelvis appear within normal limits. No free intraperitoneal fluid. No pelvic adenopathy. There are multiple colonic diverticula.     Impression: Impression: 1. Acute nondisplaced fractures involving the left superior pubic ramus near the midline. There is also an acute nondisplaced fracture of the left sacral ala. Sacral iliac  joint is intact. Electronically Signed: Vick Barthens  4/16/2023 6:09 PM EDT  Workstation ID: [id number]    XR Hip With or Without Pelvis 2 - 3 View Left    Result Date: 4/16/2023  Narrative: XR HIP W OR WO PELVIS 2-3 VIEW LEFT Date of Exam: 4/16/2023 4:07 PM EDT Indication: fall from bed. Comparison: 3/9/2023 Findings: There is diffuse osteopenia. There is no acute fracture or dislocation. Hip joint spaces appear within normal limits and symmetrical bilaterally. Soft tissues are unremarkable.     Impression: Impression: 1. Osteopenia. No acute bony abnormality of the pelvis or left hip. Electronically Signed: iVck Enamorado  4/16/2023 4:30 PM EDT  Workstation ID: [id number]        Assessment:  Patient Active Problem List   Diagnosis   • Accidental fall from bed, initial encounter   • Dementia   • CKD (chronic kidney disease) stage 3, GFR 30-59 ml/min   • HLD (hyperlipidemia)   • OA (osteoarthritis)   • UTI (urinary tract infection)   • Pubic ramus fracture   • Sacral fracture   • Hypokalemia           Plan: 73-year-old female with minimally displaced left superior pubic ramus, sacral fracture.  Plan for protected weightbearing left lower extremity.  Fracture pattern appears stable.  PT/OT.  I do not anticipate operative intervention.  Follow-up 6 weeks in clinic for repeat x-rays..             Date: 4/20/2023    Brad Rico Jr, MD

## 2023-04-20 NOTE — CASE MANAGEMENT/SOCIAL WORK
Continued Stay Note  [facility]     Patient Name: Theresa Barrow  MRN: [id number]  Today's Date: 4/20/2023    Admit Date: 4/16/2023    Plan: rehab   Discharge Plan     Row Name 04/20/23 0920       Plan    Plan rehab    Patient/Family in Agreement with Plan other (see comments)    Plan Comments CM called [facility], [facility] and [facility] for updates. [facility] cannot accept patient due to her insurance being out of network. Also, Suzanne with [facility] called and thought that the referral was for LTC. I called the daughter of the patient who stated that they cannot afford to self pay for LTC, so after rehab the plan is to have patient return home with hospice. Hospice is following. CM called Suzanne to notify her of the family's decision for rehab only then home with hospice. CM will follow up.    Final Discharge Disposition Code 03 - skilled nursing facility (SNF)               Discharge Codes    No documentation.               Expected Discharge Date and Time     Expected Discharge Date Expected Discharge Time    Apr 21, 2023             Hailey Gonzalez RN

## 2023-04-20 NOTE — PROGRESS NOTES
Continued Stay Note  [facility]     Patient Name: Theresa Barrow  MRN: [id number]  Today's Date: 4/20/2023    Admit Date: 4/16/2023    Plan: Home with [facility]   Discharge Plan     Row Name 04/20/23 1822       Plan    Plan Home with [facility]    Plan Comments Telephone call from pt's daughter Wendy asking to meet with this writer regarding pt going home with hospice. Met with pt, pt's spouse and daughter, Wendy. Wendy stated the family has realized due to pt's decline both cognitively and functionally pt is not appropriate for short term rehab. The family has decided to take pt home with paid caregivers and hospice. Reviewed pt's current condition with family. Family stated prior to the current fall pt had been ambulatory, pt is now bed/chair bound and requires assist to stand and pivot to the chair. Family has to feed pt, pt does not recognize family, pt unable to carry on any conversation, will response with yep when asked a question. Informed family pt can have hospice at home, discussed pt discharging home on Saturday 4/22, allowing time for Wendy get the caregivers in place and  have equipment delivered-pt will need a hospital bed, overbed table and transport w/c with a cushion. Equipment will be delivered to pt's home tomorrow afternoon. Wendy stated has spoken with a home care agency and will most likely use the agency for caregivers. Wendy will call tomorrow to arrange for caregivers for Saturday. Secure message chat sent to pt's care team regarding discharge, Dr. Trinidad in agreement with Saturday discharge. Ambulance transport request sent to [facility] [place], operated by [facility]. Provided family with the hospice 24 hr number to call to initiate hospice services when pt arrives home on Saturday. Will continue to follow. Please call [phone] if can be of further assistance.               Discharge Codes    No documentation.               Expected Discharge Date and Time     Expected Discharge Date  Expected Discharge Time    Apr 21, 2023             Lindsay Hurtado RN

## 2023-04-20 NOTE — PROGRESS NOTES
[facility] Medicine Services  PROGRESS NOTE    Patient Name: Theresa Barrow  : 1950  MRN: [id number]    Date of Admission: 2023  Primary Care Physician: Mukul Fontaine MD    Subjective   Subjective     CC:  Debility, fall    HPI:  Patient resting in bed. No changes, remains confused.    ROS:  UTO due to dementia    Objective   Objective     Vital Signs:   Temp:  [98.2 °F (36.8 °C)-98.6 °F (37 °C)] 98.3 °F (36.8 °C)  Heart Rate:  [55-73] 55  Resp:  [18] 18  BP: (140-169)/(58-80) 142/70     Physical Exam:  Constitutional: No acute distress, awake, alert  HENT: NCAT, mucous membranes moist  Respiratory: Clear to auscultation bilaterally, respiratory effort normal   Cardiovascular: RRR, no murmurs, rubs, or gallops  Gastrointestinal:soft, nontender, nondistended  Musculoskeletal: No bilateral ankle edema  Neurologic: severe dementia, able to answer simple yes/no questions, moves all ext.  Skin: No rashes    Exam unchanged from       Results Reviewed:  LAB RESULTS:      Lab 23  0510 23  1639   WBC 11.59* 14.83*   HEMOGLOBIN 9.7* 10.3*   HEMATOCRIT 30.6* 31.6*   PLATELETS 299 330   NEUTROS ABS 8.25* 12.48*   IMMATURE GRANS (ABS) 0.09* 0.08*   LYMPHS ABS 1.62 0.92   MONOS ABS 1.23* 1.17*   EOS ABS 0.32 0.09   MCV 98.7* 96.0   PROTIME  --  14.2   APTT  --  28.1*         Lab 23  1639 23  0510 23  1639   SODIUM  --  142 141   POTASSIUM 4.2 3.4* 3.4*   CHLORIDE  --  104 103   CO2  --  25.0 26.0   ANION GAP  --  13.0 12.0   BUN  --  23 18   CREATININE  --  1.34* 1.07*   EGFR  --  41.4* 54.3*   GLUCOSE  --  112* 140*   CALCIUM  --  8.5* 8.6   MAGNESIUM  --  2.0  --          Lab 23  1639   TOTAL PROTEIN 6.3   ALBUMIN 3.4*   GLOBULIN 2.9   ALT (SGPT) 15   AST (SGOT) 26   BILIRUBIN 0.6   ALK PHOS 114         Lab 23  1639   PROTIME 14.2   INR 1.10             Lab 23  1824 23  1711   ABO TYPING A A   RH TYPING Positive Positive    ANTIBODY SCREEN  --  Negative         Brief Urine Lab Results  (Last result in the past 365 days)      Color   Clarity   Blood   Leuk Est   Nitrite   Protein   CREAT   Urine HCG        04/16/23 1709 Yellow   Turbid   Moderate (2+)   Large (3+)   Positive   100 mg/dL (2+)                 Microbiology Results Abnormal     None          No radiology results from the last 24 hrs        Current medications:  Scheduled Meds:Calcium Carb-Cholecalciferol, 1 tablet, Oral, Daily  cefTRIAXone, 1 g, Intravenous, Q24H  citalopram, 20 mg, Oral, Daily  divalproex, 125 mg, Oral, Daily  donepezil, 10 mg, Oral, BID  memantine, 10 mg, Oral, Daily  saccharomyces boulardii, 250 mg, Oral, BID  sodium chloride, 10 mL, Intravenous, Q12H  vitamin B-12, 50 mcg, Oral, Daily  vitamin D3, 5,000 Units, Oral, Daily      Continuous Infusions:   PRN Meds:.•  acetaminophen **OR** acetaminophen **OR** acetaminophen  •  HYDROmorphone  •  ondansetron **OR** ondansetron  •  Potassium Replacement - Follow Nurse / BPA Driven Protocol  •  [COMPLETED] Insert Peripheral IV **AND** sodium chloride  •  sodium chloride  •  sodium chloride    Assessment & Plan   Assessment & Plan     Active Hospital Problems    Diagnosis  POA   • **Accidental fall from bed, initial encounter [W06.XXXA]  Yes   • Dementia [F03.90]  Unknown   • CKD (chronic kidney disease) stage 3, GFR 30-59 ml/min [N18.30]  Unknown   • HLD (hyperlipidemia) [E78.5]  Unknown   • OA (osteoarthritis) [M19.90]  Unknown   • UTI (urinary tract infection) [N39.0]  Unknown   • Pubic ramus fracture [S32.599A]  Unknown   • Sacral fracture [S32.10XA]  Unknown   • Hypokalemia [E87.6]  Unknown      Resolved Hospital Problems   No resolved problems to display.        Brief Hospital Course to date:  Theresa Barrow is a 73 y.o. female PMH of CKD3, HLD, OA and dementia with advancing debility presents to the ED after a fall.      Fall  Pubic Ramus Fracture  Sacral Fracture  -CT pelvis shows acute nondisplaced  fractures involving the left superior pubic ramus and acute nondisplaced fracture of the left sacral ala  -Pain control PRN  -Conservative management, non-op  -WBAT and PT/OT as tolerated     Proteus UTI  -Rocephin      Anemia  -at baseline    Hypokalemia  -Replace per protocol     CKD3  - at baseline  -Avoid nephrotoxic agents  -Strict I/Os     Dementia  -Fall precautions    GOC  -  unsafe to care for patient, will need placement vs. Home with hospice care. Hospice following    Expected Discharge Location and Transportation:  Home with hospice vs LTC/SNF  Expected Discharge   Expected Discharge Date and Time     Expected Discharge Date Expected Discharge Time    Apr 21, 2023            DVT prophylaxis:  Mechanical DVT prophylaxis orders are present.     AM-PAC 6 Clicks Score (PT): 6 (04/19/23 2000)    CODE STATUS:   Code Status and Medical Interventions:   Ordered at: 04/16/23 2003     Medical Intervention Limits:    NO intubation (DNI)     Code Status (Patient has no pulse and is not breathing):    No CPR (Do Not Attempt to Resuscitate)     Medical Interventions (Patient has pulse or is breathing):    Limited Support       Martha Trinidad,   04/20/23

## 2023-04-20 NOTE — PROGRESS NOTES
Continued Stay Note  [facility]     Patient Name: Theresa Barrow  MRN: [id number]  Today's Date: 4/20/2023    Admit Date: 4/16/2023    Plan: Rehab   Discharge Plan     Row Name 04/20/23 0952       Plan    Plan Rehab    Plan Comments Hospice liaison continues to follow on the periphery while case management finds a facility for pt to have rehab. Please call  [phone] if pt can be of further assistance.    Row Name 04/20/23 0920       Plan    Plan     Patient/Family in Agreement with Plan     Plan Comments     Final Discharge Disposition Code                Discharge Codes    No documentation.               Expected Discharge Date and Time     Expected Discharge Date Expected Discharge Time    Apr 21, 2023             Lindsay Hurtado RN

## 2023-04-21 PROCEDURE — 96376 TX/PRO/DX INJ SAME DRUG ADON: CPT

## 2023-04-21 PROCEDURE — 99231 SBSQ HOSP IP/OBS SF/LOW 25: CPT | Performed by: INTERNAL MEDICINE

## 2023-04-21 PROCEDURE — G0378 HOSPITAL OBSERVATION PER HR: HCPCS

## 2023-04-21 PROCEDURE — 25010000002 HYDROMORPHONE PER 4 MG: Performed by: INTERNAL MEDICINE

## 2023-04-21 RX ADMIN — Medication 50 MCG: at 08:40

## 2023-04-21 RX ADMIN — Medication 5000 UNITS: at 08:40

## 2023-04-21 RX ADMIN — CITALOPRAM HYDROBROMIDE 20 MG: 20 TABLET ORAL at 08:40

## 2023-04-21 RX ADMIN — Medication 250 MG: at 08:40

## 2023-04-21 RX ADMIN — Medication 250 MG: at 20:18

## 2023-04-21 RX ADMIN — DONEPEZIL HYDROCHLORIDE 10 MG: 10 TABLET ORAL at 20:18

## 2023-04-21 RX ADMIN — HYDROMORPHONE HYDROCHLORIDE 0.5 MG: 1 INJECTION, SOLUTION INTRAMUSCULAR; INTRAVENOUS; SUBCUTANEOUS at 06:38

## 2023-04-21 RX ADMIN — DIVALPROEX SODIUM 125 MG: 125 TABLET, DELAYED RELEASE ORAL at 08:40

## 2023-04-21 RX ADMIN — MEMANTINE 10 MG: 10 TABLET ORAL at 08:40

## 2023-04-21 RX ADMIN — Medication 10 ML: at 08:47

## 2023-04-21 RX ADMIN — DONEPEZIL HYDROCHLORIDE 10 MG: 10 TABLET ORAL at 08:40

## 2023-04-21 RX ADMIN — HYDROMORPHONE HYDROCHLORIDE 0.5 MG: 1 INJECTION, SOLUTION INTRAMUSCULAR; INTRAVENOUS; SUBCUTANEOUS at 16:10

## 2023-04-21 RX ADMIN — Medication 1 TABLET: at 08:40

## 2023-04-21 RX ADMIN — HYDROMORPHONE HYDROCHLORIDE 0.5 MG: 1 INJECTION, SOLUTION INTRAMUSCULAR; INTRAVENOUS; SUBCUTANEOUS at 12:37

## 2023-04-21 RX ADMIN — Medication 10 ML: at 20:18

## 2023-04-21 NOTE — PROGRESS NOTES
Continued Stay Note  [facility]     Patient Name: Theresa Barrow  MRN: [id number]  Today's Date: 4/21/2023    Admit Date: 4/16/2023    Plan: Home with [facility]   Discharge Plan     Row Name 04/21/23 1129       Plan    Plan Home with [facility]    Plan Comments Plan remains for pt to discharge home tomorrow, 4/22,  ambulance transportation arranged with [facility] for 0900. Notified pt's care team of discharge time. Visit made to pt, pt's spouse present. Informed of discharge time. Provided the family with the hospice 24 hr number yesterday, informed the  to call the hospice 24 hr number to initiate hospice services when pt arrives home tomorrow,  verbalized understanding. Hospice is delivering equipment to pt's home this afternoon. EMS/DNR and PCS forms placed on pt's chart. Will continue to follow. Please call [phone] if can be of further assistance.               Discharge Codes    No documentation.               Expected Discharge Date and Time     Expected Discharge Date Expected Discharge Time    Apr 22, 2023             Lindsay Hurtado RN

## 2023-04-21 NOTE — PROGRESS NOTES
[facility] Medicine Services  PROGRESS NOTE    Patient Name: Theresa Barrow  : 1950  MRN: [id number]    Date of Admission: 2023  Primary Care Physician: Mukul Fontaine MD    Subjective   Subjective     CC:  Debility, fall    HPI:  Patient resting in bed.remains pleasantly confused, no changes overnight.    ROS:  UTO due to dementia    Objective   Objective     Vital Signs:   Temp:  [97.5 °F (36.4 °C)-99.4 °F (37.4 °C)] 98.8 °F (37.1 °C)  Heart Rate:  [64-85] 66  Resp:  [17-18] 17  BP: (133-161)/(60-84) 133/70     Physical Exam:  Constitutional: No acute distress, awake, alert  HENT: NCAT, mucous membranes moist  Respiratory: Clear to auscultation bilaterally, respiratory effort normal   Cardiovascular: RRR, no murmurs, rubs, or gallops  Gastrointestinal:soft, nontender, nondistended  Musculoskeletal: No bilateral ankle edema  Neurologic: severe dementia, able to answer simple yes/no questions, moves all ext.  Skin: No rashes    Exam unchanged from       Results Reviewed:  LAB RESULTS:      Lab 23  0510 23  1639   WBC 11.59* 14.83*   HEMOGLOBIN 9.7* 10.3*   HEMATOCRIT 30.6* 31.6*   PLATELETS 299 330   NEUTROS ABS 8.25* 12.48*   IMMATURE GRANS (ABS) 0.09* 0.08*   LYMPHS ABS 1.62 0.92   MONOS ABS 1.23* 1.17*   EOS ABS 0.32 0.09   MCV 98.7* 96.0   PROTIME  --  14.2   APTT  --  28.1*         Lab 23  1639 23  0510 23  1639   SODIUM  --  142 141   POTASSIUM 4.2 3.4* 3.4*   CHLORIDE  --  104 103   CO2  --  25.0 26.0   ANION GAP  --  13.0 12.0   BUN  --  23 18   CREATININE  --  1.34* 1.07*   EGFR  --  41.4* 54.3*   GLUCOSE  --  112* 140*   CALCIUM  --  8.5* 8.6   MAGNESIUM  --  2.0  --          Lab 23  1639   TOTAL PROTEIN 6.3   ALBUMIN 3.4*   GLOBULIN 2.9   ALT (SGPT) 15   AST (SGOT) 26   BILIRUBIN 0.6   ALK PHOS 114         Lab 23  1639   PROTIME 14.2   INR 1.10             Lab 23  1824 23  1711   ABO TYPING A A   RH  TYPING Positive Positive   ANTIBODY SCREEN  --  Negative         Brief Urine Lab Results  (Last result in the past 365 days)      Color   Clarity   Blood   Leuk Est   Nitrite   Protein   CREAT   Urine HCG        04/16/23 1709 Yellow   Turbid   Moderate (2+)   Large (3+)   Positive   100 mg/dL (2+)                 Microbiology Results Abnormal     None          No radiology results from the last 24 hrs        Current medications:  Scheduled Meds:Calcium Carb-Cholecalciferol, 1 tablet, Oral, Daily  citalopram, 20 mg, Oral, Daily  divalproex, 125 mg, Oral, Daily  donepezil, 10 mg, Oral, BID  memantine, 10 mg, Oral, Daily  saccharomyces boulardii, 250 mg, Oral, BID  sodium chloride, 10 mL, Intravenous, Q12H  vitamin B-12, 50 mcg, Oral, Daily  vitamin D3, 5,000 Units, Oral, Daily      Continuous Infusions:   PRN Meds:.•  acetaminophen **OR** acetaminophen **OR** acetaminophen  •  HYDROmorphone  •  ondansetron **OR** ondansetron  •  Potassium Replacement - Follow Nurse / BPA Driven Protocol  •  [COMPLETED] Insert Peripheral IV **AND** sodium chloride  •  sodium chloride  •  sodium chloride    Assessment & Plan   Assessment & Plan     Active Hospital Problems    Diagnosis  POA   • **Accidental fall from bed, initial encounter [W06.XXXA]  Yes   • Dementia [F03.90]  Unknown   • CKD (chronic kidney disease) stage 3, GFR 30-59 ml/min [N18.30]  Unknown   • HLD (hyperlipidemia) [E78.5]  Unknown   • OA (osteoarthritis) [M19.90]  Unknown   • UTI (urinary tract infection) [N39.0]  Unknown   • Pubic ramus fracture [S32.599A]  Unknown   • Sacral fracture [S32.10XA]  Unknown   • Hypokalemia [E87.6]  Unknown      Resolved Hospital Problems   No resolved problems to display.        Brief Hospital Course to date:  Theresa Barrow is a 73 y.o. female PMH of CKD3, HLD, OA and dementia with advancing debility presents to the ED after a fall.      Fall  Pubic Ramus Fracture  Sacral Fracture  -CT pelvis shows acute nondisplaced fractures  involving the left superior pubic ramus and acute nondisplaced fracture of the left sacral ala  -Pain control PRN  -Conservative management, non-op  -WBAT and PT/OT as tolerated     Proteus UTI  -Rocephin      Anemia  -at baseline    Hypokalemia  -Replace per protocol     CKD3  - at baseline  -Avoid nephrotoxic agents  -Strict I/Os     Dementia  -Fall precautions    GOC  -plan is home with hospice tomorrow    Expected Discharge Location and Transportation:  Home with hospice, transport tomorrow via [facility] at 9AM  Expected Discharge   Expected Discharge Date and Time     Expected Discharge Date Expected Discharge Time    Apr 22, 2023            DVT prophylaxis:  Mechanical DVT prophylaxis orders are present.     AM-PAC 6 Clicks Score (PT): 6 (04/19/23 2000)    CODE STATUS:   Code Status and Medical Interventions:   Ordered at: 04/16/23 2003     Medical Intervention Limits:    NO intubation (DNI)     Code Status (Patient has no pulse and is not breathing):    No CPR (Do Not Attempt to Resuscitate)     Medical Interventions (Patient has pulse or is breathing):    Limited Support       Martha Trinidad,   04/21/23

## 2023-04-22 ENCOUNTER — READMISSION MANAGEMENT (OUTPATIENT)
Dept: CALL CENTER | Facility: HOSPITAL | Age: 73
End: 2023-04-22
Payer: MEDICARE

## 2023-04-22 VITALS
RESPIRATION RATE: 16 BRPM | HEART RATE: 75 BPM | HEIGHT: 64 IN | TEMPERATURE: 99.2 F | WEIGHT: 133 LBS | DIASTOLIC BLOOD PRESSURE: 80 MMHG | OXYGEN SATURATION: 94 % | SYSTOLIC BLOOD PRESSURE: 153 MMHG | BODY MASS INDEX: 22.71 KG/M2

## 2023-04-22 PROCEDURE — G0378 HOSPITAL OBSERVATION PER HR: HCPCS

## 2023-04-22 PROCEDURE — 96376 TX/PRO/DX INJ SAME DRUG ADON: CPT

## 2023-04-22 PROCEDURE — 99238 HOSP IP/OBS DSCHRG MGMT 30/<: CPT | Performed by: INTERNAL MEDICINE

## 2023-04-22 PROCEDURE — 25010000002 HYDROMORPHONE PER 4 MG: Performed by: INTERNAL MEDICINE

## 2023-04-22 RX ADMIN — DONEPEZIL HYDROCHLORIDE 10 MG: 10 TABLET ORAL at 05:07

## 2023-04-22 RX ADMIN — MEMANTINE 10 MG: 10 TABLET ORAL at 05:07

## 2023-04-22 RX ADMIN — DIVALPROEX SODIUM 125 MG: 125 TABLET, DELAYED RELEASE ORAL at 05:07

## 2023-04-22 RX ADMIN — HYDROMORPHONE HYDROCHLORIDE 0.5 MG: 1 INJECTION, SOLUTION INTRAMUSCULAR; INTRAVENOUS; SUBCUTANEOUS at 04:53

## 2023-04-22 RX ADMIN — Medication 250 MG: at 05:07

## 2023-04-22 RX ADMIN — Medication 50 MCG: at 05:07

## 2023-04-22 RX ADMIN — Medication 5000 UNITS: at 05:07

## 2023-04-22 RX ADMIN — CITALOPRAM HYDROBROMIDE 20 MG: 20 TABLET ORAL at 05:07

## 2023-04-22 RX ADMIN — Medication 1 TABLET: at 05:07

## 2023-04-22 NOTE — OUTREACH NOTE
Prep Survey    Flowsheet Row Responses   [facility] facility patient discharged from? [place]   Is LACE score < 7 ? No   Eligibility Not Eligible   What are the reasons patient is not eligible? Hospice/Pallative Care   Does the patient have one of the following disease processes/diagnoses(primary or secondary)? Other   Prep survey completed? Yes          Swathi MARTIN - Registered Nurse

## 2023-04-22 NOTE — DISCHARGE PLACEMENT REQUEST
"Theresa Larson (73 y.o. Female)   D/C Summary    Date of Birth   1950    Social Security Number       Address   [address] [id number]    Home Phone   [phone]    MRN   [id number]       Restorationist   None    Marital Status                               Admission Date   23    Admission Type   Emergency    Admitting Provider   Martha Trinidad DO    Attending Provider   Martha Trinidad DO    Department, Room/Bed   [address], [address]       Discharge Date       Discharge Disposition   Home or Self Care    Discharge Destination                               Attending Provider: Martha Trinidad DO    Allergies: No Known Allergies    Isolation: None   Infection: None   Code Status: No CPR    Ht: 162.6 cm (64\")   Wt: 60.3 kg (133 lb)    Admission Cmt: None   Principal Problem: Accidental fall from bed, initial encounter [W06.XXXA]                 Active Insurance as of 2023     Primary Coverage     Payor Plan Insurance Group Employer/Plan Group    HUMANA MEDICARE REPLACEMENT HUMANA MEDICARE REPLACEMENT [id number]     Payor Plan Address Payor Plan Phone Number Payor Plan Fax Number Effective Dates    [address] [address] [phone]  2023 - None Entered    [facility] [address]       Subscriber Name Subscriber Birth Date Member ID       THERESA LARSON 1950 [id number]                 Emergency Contacts      (Rel.) Home Phone Work Phone Mobile Phone    MARSHANAHUN CUNNINGHAM (Spouse) [phone] -- [phone]    RADHIKA ROD (Daughter) [phone] -- [phone]                 Discharge Summary      Martha Trinidad DO at 23 0729              [facility] Medicine Services  DISCHARGE SUMMARY    Patient Name: Theresa Larson  : 1950  MRN: [id number]    Date of Admission: 2023  3:55 PM  Date of Discharge:  2023  Primary Care Physician: Mukul Fontaine MD    Consults     Date and Time Order " Name Status Description    4/17/2023 12:34 AM Inpatient Orthopedic Surgery Consult Completed           Hospital Course     Presenting Problem:   Accidental fall from bed, initial encounter [W06.XXXA]    Active Hospital Problems    Diagnosis  POA   • **Accidental fall from bed, initial encounter [W06.XXXA]  Yes   • Dementia [F03.90]  Unknown   • CKD (chronic kidney disease) stage 3, GFR 30-59 ml/min [N18.30]  Unknown   • HLD (hyperlipidemia) [E78.5]  Unknown   • OA (osteoarthritis) [M19.90]  Unknown   • UTI (urinary tract infection) [N39.0]  Unknown   • Pubic ramus fracture [S32.599A]  Unknown   • Sacral fracture [S32.10XA]  Unknown   • Hypokalemia [E87.6]  Unknown      Resolved Hospital Problems   No resolved problems to display.          Hospital Course:  Theresa Barrow is a 73 y.o. female PMH of CKD3, HLD, OA and dementia with advancing debility presents to the ED after a fall.      Fall  Pubic Ramus Fracture  Sacral Fracture  -CT pelvis shows acute nondisplaced fractures involving the left superior pubic ramus and acute nondisplaced fracture of the left sacral ala  -Conservative management, non-op  -WBAT as tolerated     Proteus UTI  -completed course of CTX     Anemia  -at baseline     CKD3  - at baseline     Dementia  -Namenda     [facility]  -plan is home with hospice today      Discharge Follow Up Recommendations for outpatient labs/diagnostics:  - d/c home w/hospice    Day of Discharge     HPI:   Pleasant, confused, no other issues overnight, eating breakfast    Review of Systems  UTO due to dementia    Vital Signs:   Temp:  [98 °F (36.7 °C)-100.2 °F (37.9 °C)] 98.1 °F (36.7 °C)  Heart Rate:  [66-83] 68  Resp:  [16-18] 16  BP: (131-150)/() 150/82      Physical Exam:  Constitutional: No acute distress, awake, alert  HENT: NCAT, mucous membranes moist  Respiratory: Clear to auscultation bilaterally, respiratory effort normal   Cardiovascular: RRR, no murmurs, rubs, or gallops  Gastrointestinal:soft, nontender,  nondistended  Musculoskeletal: No bilateral ankle edema  Neurologic: severe dementia, able to answer simple yes/no questions, moves all ext.  Skin: No rashes    Pertinent  and/or Most Recent Results     LAB RESULTS:      Lab 04/17/23  0510 04/16/23  1639   WBC 11.59* 14.83*   HEMOGLOBIN 9.7* 10.3*   HEMATOCRIT 30.6* 31.6*   PLATELETS 299 330   NEUTROS ABS 8.25* 12.48*   IMMATURE GRANS (ABS) 0.09* 0.08*   LYMPHS ABS 1.62 0.92   MONOS ABS 1.23* 1.17*   EOS ABS 0.32 0.09   MCV 98.7* 96.0   PROTIME  --  14.2   APTT  --  28.1*         Lab 04/17/23  1639 04/17/23  0510 04/16/23  1639   SODIUM  --  142 141   POTASSIUM 4.2 3.4* 3.4*   CHLORIDE  --  104 103   CO2  --  25.0 26.0   ANION GAP  --  13.0 12.0   BUN  --  23 18   CREATININE  --  1.34* 1.07*   EGFR  --  41.4* 54.3*   GLUCOSE  --  112* 140*   CALCIUM  --  8.5* 8.6   MAGNESIUM  --  2.0  --          Lab 04/16/23  1639   TOTAL PROTEIN 6.3   ALBUMIN 3.4*   GLOBULIN 2.9   ALT (SGPT) 15   AST (SGOT) 26   BILIRUBIN 0.6   ALK PHOS 114         Lab 04/16/23  1639   PROTIME 14.2   INR 1.10             Lab 04/16/23  1824 04/16/23  1711   ABO TYPING A A   RH TYPING Positive Positive   ANTIBODY SCREEN  --  Negative         Brief Urine Lab Results  (Last result in the past 365 days)      Color   Clarity   Blood   Leuk Est   Nitrite   Protein   CREAT   Urine HCG        04/16/23 1709 Yellow   Turbid   Moderate (2+)   Large (3+)   Positive   100 mg/dL (2+)               Microbiology Results (last 10 days)     Procedure Component Value - Date/Time    Urine Culture - Urine, Urine, Catheter [[id number]]  (Abnormal)  (Susceptibility) Collected: 04/16/23 1709    Lab Status: Final result Specimen: Urine, Catheter Updated: 04/18/23 0702     Urine Culture >100,000 CFU/mL Proteus mirabilis    Narrative:      Colonization of the urinary tract without infection is common. Treatment is discouraged unless the patient is symptomatic, pregnant, or undergoing an invasive urologic procedure.     Susceptibility      Proteus mirabilis      IRMA      Ampicillin Susceptible      Ampicillin + Sulbactam Susceptible      Cefazolin Susceptible      Cefepime Susceptible      Ceftazidime Susceptible      Ceftriaxone Susceptible      Gentamicin Susceptible      Levofloxacin Susceptible      Nitrofurantoin Resistant     Piperacillin + Tazobactam Susceptible      Trimethoprim + Sulfamethoxazole Susceptible                                 CT Pelvis Without Contrast    Result Date: 4/16/2023  CT PELVIS WO CONTRAST Date of Exam: 4/16/2023 5:36 PM EDT Indication: fall from bed with significant left hip/pelvis pain. Comparison: Radiograph 4/16/2023 Technique: Axial CT images were obtained of the pelvis without contrast administration.  Reconstructed coronal and sagittal images were also obtained. Automated exposure control and iterative construction methods were used. Findings: There is some minimal buckling of the superior pubic ramus on the left consistent with a nondisplaced fracture. This is not seen even in retrospect on prior radiograph. There is also buckling of the left sacral ala consistent with a nondisplaced fracture. The sacroiliac joints appear intact. There is mild narrowing of the hip joint spaces bilaterally. Left proximal femur appears intact. Visualized portions the right femur also appear intact. Soft tissues of the pelvis appear within normal limits. No free intraperitoneal fluid. No pelvic adenopathy. There are multiple colonic diverticula.     Impression: 1. Acute nondisplaced fractures involving the left superior pubic ramus near the midline. There is also an acute nondisplaced fracture of the left sacral ala. Sacral iliac joint is intact. Electronically Signed: Vick Enamorado  4/16/2023 6:09 PM EDT  Workstation ID: [id number]    XR Hip With or Without Pelvis 2 - 3 View Left    Result Date: 4/16/2023  XR HIP W OR WO PELVIS 2-3 VIEW LEFT Date of Exam: 4/16/2023 4:07 PM EDT Indication: fall from bed.  Comparison: 3/9/2023 Findings: There is diffuse osteopenia. There is no acute fracture or dislocation. Hip joint spaces appear within normal limits and symmetrical bilaterally. Soft tissues are unremarkable.     Impression: 1. Osteopenia. No acute bony abnormality of the pelvis or left hip. Electronically Signed: Vick Enamorado  4/16/2023 4:30 PM EDT  Workstation ID: [id number]                  Plan for Follow-up of Pending Labs/Results:     Discharge Details        Discharge Medications      Continue These Medications      Instructions Start Date   calcium carbonate 600 MG tablet  Commonly known as: OS-ALEKSANADR   600 mg, Oral, Daily      citalopram 20 MG tablet  Commonly known as: CeleXA   20 mg, Oral, Daily      divalproex 125 MG DR tablet  Commonly known as: DEPAKOTE   125 mg, Oral, Daily, Delayed release      donepezil 10 MG tablet  Commonly known as: ARICEPT   10 mg, Oral, 2 Times Daily      memantine 10 MG tablet  Commonly known as: NAMENDA   10 mg, Oral, 2 Times Daily      PROLIA SC   Subcutaneous      saccharomyces boulardii 250 MG capsule  Commonly known as: FLORASTOR   250 mg, Oral, 2 Times Daily      vitamin B-12 100 MCG tablet  Commonly known as: CYANOCOBALAMIN   50 mcg, Oral, Daily      vitamin D3 125 MCG (5000 UT) capsule capsule   5,000 Units, Oral, Daily             No Known Allergies      Discharge Disposition:  Home or Self Care    Diet:  Hospital:  Diet Order   Procedures   • Diet: Regular/House Diet; Texture: Regular Texture (IDDSI 7); Fluid Consistency: Thin (IDDSI 0)       Activity:  - as tolerated    Restrictions or Other Recommendations:  - none       CODE STATUS:    Code Status and Medical Interventions:   Ordered at: 04/16/23 2003     Medical Intervention Limits:    NO intubation (DNI)     Code Status (Patient has no pulse and is not breathing):    No CPR (Do Not Attempt to Resuscitate)     Medical Interventions (Patient has pulse or is breathing):    Limited Support       Future Appointments    Date Time Provider Department Center   4/22/2023  9:00 AM EMS 1 [facility] S BRITTA Trinidad DO  04/22/23      Time Spent on Discharge:  I spent  25  minutes on this discharge activity which included: face-to-face encounter with the patient, reviewing the data in the system, coordination of the care with the nursing staff as well as consultants, documentation, and entering orders.            Electronically signed by Martha Trinidad DO at 04/22/23 0861

## 2023-04-22 NOTE — PROGRESS NOTES
Continued Stay Note   [facility]     Patient Name: Theresa Barrow  MRN: [id number]  Today's Date: 4/22/2023    Admit Date: 4/16/2023    Plan: Home w/ [facility]   Discharge Plan     Row Name 04/22/23 0906       Plan    Plan Home w/ [facility]    Patient/Family in Agreement with Plan yes    Final Discharge Disposition Code 50 - home with hospice    Final Note Pt will d/c home w/ [facility] via ambulance. Family has the 24 hr number to initiate hospice services when the pt. arrives home. D/C Summary has been sent to [facility].               Discharge Codes    No documentation.               Expected Discharge Date and Time     Expected Discharge Date Expected Discharge Time    Apr 22, 2023             Georgina Monson

## 2023-04-22 NOTE — DISCHARGE SUMMARY
[facility]  DISCHARGE SUMMARY    Patient Name: Theresa Barrow  : 1950  MRN: [id number]    Date of Admission: 2023  3:55 PM  Date of Discharge:  2023  Primary Care Physician: Mukul Fontaine MD    Consults     Date and Time Order Name Status Description    2023 12:34 AM Inpatient Orthopedic Surgery Consult Completed           Hospital Course     Presenting Problem:   Accidental fall from bed, initial encounter [W06.XXXA]    Active Hospital Problems    Diagnosis  POA   • **Accidental fall from bed, initial encounter [W06.XXXA]  Yes   • Dementia [F03.90]  Unknown   • CKD (chronic kidney disease) stage 3, GFR 30-59 ml/min [N18.30]  Unknown   • HLD (hyperlipidemia) [E78.5]  Unknown   • OA (osteoarthritis) [M19.90]  Unknown   • UTI (urinary tract infection) [N39.0]  Unknown   • Pubic ramus fracture [S32.599A]  Unknown   • Sacral fracture [S32.10XA]  Unknown   • Hypokalemia [E87.6]  Unknown      Resolved Hospital Problems   No resolved problems to display.          Hospital Course:  Theresa Barrow is a 73 y.o. female PMH of CKD3, HLD, OA and dementia with advancing debility presents to the ED after a fall.      Fall  Pubic Ramus Fracture  Sacral Fracture  -CT pelvis shows acute nondisplaced fractures involving the left superior pubic ramus and acute nondisplaced fracture of the left sacral ala  -Conservative management, non-op  -WBAT as tolerated     Proteus UTI  -completed course of CTX     Anemia  -at baseline     CKD3  - at baseline     Dementia  -Namenda     GO  -plan is home with hospice today      Discharge Follow Up Recommendations for outpatient labs/diagnostics:  - d/c home w/hospice    Day of Discharge     HPI:   Pleasant, confused, no other issues overnight, eating breakfast    Review of Systems  UTO due to dementia    Vital Signs:   Temp:  [98 °F (36.7 °C)-100.2 °F (37.9 °C)] 98.1 °F (36.7 °C)  Heart Rate:  [66-83] 68  Resp:  [16-18]  16  BP: (131-150)/() 150/82      Physical Exam:  Constitutional: No acute distress, awake, alert  HENT: NCAT, mucous membranes moist  Respiratory: Clear to auscultation bilaterally, respiratory effort normal   Cardiovascular: RRR, no murmurs, rubs, or gallops  Gastrointestinal:soft, nontender, nondistended  Musculoskeletal: No bilateral ankle edema  Neurologic: severe dementia, able to answer simple yes/no questions, moves all ext.  Skin: No rashes    Pertinent  and/or Most Recent Results     LAB RESULTS:      Lab 04/17/23  0510 04/16/23  1639   WBC 11.59* 14.83*   HEMOGLOBIN 9.7* 10.3*   HEMATOCRIT 30.6* 31.6*   PLATELETS 299 330   NEUTROS ABS 8.25* 12.48*   IMMATURE GRANS (ABS) 0.09* 0.08*   LYMPHS ABS 1.62 0.92   MONOS ABS 1.23* 1.17*   EOS ABS 0.32 0.09   MCV 98.7* 96.0   PROTIME  --  14.2   APTT  --  28.1*         Lab 04/17/23  1639 04/17/23  0510 04/16/23  1639   SODIUM  --  142 141   POTASSIUM 4.2 3.4* 3.4*   CHLORIDE  --  104 103   CO2  --  25.0 26.0   ANION GAP  --  13.0 12.0   BUN  --  23 18   CREATININE  --  1.34* 1.07*   EGFR  --  41.4* 54.3*   GLUCOSE  --  112* 140*   CALCIUM  --  8.5* 8.6   MAGNESIUM  --  2.0  --          Lab 04/16/23  1639   TOTAL PROTEIN 6.3   ALBUMIN 3.4*   GLOBULIN 2.9   ALT (SGPT) 15   AST (SGOT) 26   BILIRUBIN 0.6   ALK PHOS 114         Lab 04/16/23  1639   PROTIME 14.2   INR 1.10             Lab 04/16/23  1824 04/16/23  1711   ABO TYPING A A   RH TYPING Positive Positive   ANTIBODY SCREEN  --  Negative         Brief Urine Lab Results  (Last result in the past 365 days)      Color   Clarity   Blood   Leuk Est   Nitrite   Protein   CREAT   Urine HCG        04/16/23 1709 Yellow   Turbid   Moderate (2+)   Large (3+)   Positive   100 mg/dL (2+)               Microbiology Results (last 10 days)     Procedure Component Value - Date/Time    Urine Culture - Urine, Urine, Catheter [[id number]]  (Abnormal)  (Susceptibility) Collected: 04/16/23 5959    Lab Status: Final result  Specimen: Urine, Catheter Updated: 04/18/23 0702     Urine Culture >100,000 CFU/mL Proteus mirabilis    Narrative:      Colonization of the urinary tract without infection is common. Treatment is discouraged unless the patient is symptomatic, pregnant, or undergoing an invasive urologic procedure.    Susceptibility      Proteus mirabilis      IRMA      Ampicillin Susceptible      Ampicillin + Sulbactam Susceptible      Cefazolin Susceptible      Cefepime Susceptible      Ceftazidime Susceptible      Ceftriaxone Susceptible      Gentamicin Susceptible      Levofloxacin Susceptible      Nitrofurantoin Resistant     Piperacillin + Tazobactam Susceptible      Trimethoprim + Sulfamethoxazole Susceptible                                 CT Pelvis Without Contrast    Result Date: 4/16/2023  CT PELVIS WO CONTRAST Date of Exam: 4/16/2023 5:36 PM EDT Indication: fall from bed with significant left hip/pelvis pain. Comparison: Radiograph 4/16/2023 Technique: Axial CT images were obtained of the pelvis without contrast administration.  Reconstructed coronal and sagittal images were also obtained. Automated exposure control and iterative construction methods were used. Findings: There is some minimal buckling of the superior pubic ramus on the left consistent with a nondisplaced fracture. This is not seen even in retrospect on prior radiograph. There is also buckling of the left sacral ala consistent with a nondisplaced fracture. The sacroiliac joints appear intact. There is mild narrowing of the hip joint spaces bilaterally. Left proximal femur appears intact. Visualized portions the right femur also appear intact. Soft tissues of the pelvis appear within normal limits. No free intraperitoneal fluid. No pelvic adenopathy. There are multiple colonic diverticula.     Impression: 1. Acute nondisplaced fractures involving the left superior pubic ramus near the midline. There is also an acute nondisplaced fracture of the left sacral  ala. Sacral iliac joint is intact. Electronically Signed: Vick Enamorado  4/16/2023 6:09 PM EDT  Workstation ID: [id number]    XR Hip With or Without Pelvis 2 - 3 View Left    Result Date: 4/16/2023  XR HIP W OR WO PELVIS 2-3 VIEW LEFT Date of Exam: 4/16/2023 4:07 PM EDT Indication: fall from bed. Comparison: 3/9/2023 Findings: There is diffuse osteopenia. There is no acute fracture or dislocation. Hip joint spaces appear within normal limits and symmetrical bilaterally. Soft tissues are unremarkable.     Impression: 1. Osteopenia. No acute bony abnormality of the pelvis or left hip. Electronically Signed: Vick Enamorado  4/16/2023 4:30 PM EDT  Workstation ID: [id number]                  Plan for Follow-up of Pending Labs/Results:     Discharge Details        Discharge Medications      Continue These Medications      Instructions Start Date   calcium carbonate 600 MG tablet  Commonly known as: OS-ALEKSANDAR   600 mg, Oral, Daily      citalopram 20 MG tablet  Commonly known as: CeleXA   20 mg, Oral, Daily      divalproex 125 MG DR tablet  Commonly known as: DEPAKOTE   125 mg, Oral, Daily, Delayed release      donepezil 10 MG tablet  Commonly known as: ARICEPT   10 mg, Oral, 2 Times Daily      memantine 10 MG tablet  Commonly known as: NAMENDA   10 mg, Oral, 2 Times Daily      PROLIA SC   Subcutaneous      saccharomyces boulardii 250 MG capsule  Commonly known as: FLORASTOR   250 mg, Oral, 2 Times Daily      vitamin B-12 100 MCG tablet  Commonly known as: CYANOCOBALAMIN   50 mcg, Oral, Daily      vitamin D3 125 MCG (5000 UT) capsule capsule   5,000 Units, Oral, Daily             No Known Allergies      Discharge Disposition:  Home or Self Care    Diet:  Hospital:  Diet Order   Procedures   • Diet: Regular/House Diet; Texture: Regular Texture (IDDSI 7); Fluid Consistency: Thin (IDDSI 0)       Activity:  - as tolerated    Restrictions or Other Recommendations:  - none       CODE STATUS:    Code Status and Medical Interventions:    Ordered at: 04/16/23 2003     Medical Intervention Limits:    NO intubation (DNI)     Code Status (Patient has no pulse and is not breathing):    No CPR (Do Not Attempt to Resuscitate)     Medical Interventions (Patient has pulse or is breathing):    Limited Support       Future Appointments   Date Time Provider Department Center   4/22/2023  9:00 AM EMS 1  [facility] [place],   04/22/23      Time Spent on Discharge:  I spent  25  minutes on this discharge activity which included: face-to-face encounter with the patient, reviewing the data in the system, coordination of the care with the nursing staff as well as consultants, documentation, and entering orders.